# Patient Record
Sex: FEMALE | Race: WHITE | Employment: UNEMPLOYED | URBAN - METROPOLITAN AREA
[De-identification: names, ages, dates, MRNs, and addresses within clinical notes are randomized per-mention and may not be internally consistent; named-entity substitution may affect disease eponyms.]

---

## 2017-03-15 ENCOUNTER — HOSPITAL ENCOUNTER (OUTPATIENT)
Dept: MAMMOGRAPHY | Age: 52
Discharge: HOME | End: 2017-03-15
Attending: OBSTETRICS & GYNECOLOGY
Payer: COMMERCIAL

## 2017-03-15 DIAGNOSIS — Z12.31 VISIT FOR SCREENING MAMMOGRAM: ICD-10-CM

## 2017-03-15 PROCEDURE — 77067 SCR MAMMO BI INCL CAD: CPT

## 2018-02-23 PROBLEM — E11.9 TYPE 2 DIABETES MELLITUS WITHOUT COMPLICATION, WITH LONG-TERM CURRENT USE OF INSULIN (HCC): Status: ACTIVE | Noted: 2018-02-23

## 2018-02-23 PROBLEM — Z79.4 TYPE 2 DIABETES MELLITUS WITHOUT COMPLICATION, WITH LONG-TERM CURRENT USE OF INSULIN (HCC): Status: ACTIVE | Noted: 2018-02-23

## 2018-11-30 ENCOUNTER — HOSPITAL ENCOUNTER (OUTPATIENT)
Dept: MAMMOGRAPHY | Age: 53
Discharge: HOME | End: 2018-11-30
Attending: FAMILY MEDICINE
Payer: COMMERCIAL

## 2018-11-30 DIAGNOSIS — Z12.39 ENCOUNTER FOR BREAST CANCER SCREENING OTHER THAN MAMMOGRAM: ICD-10-CM

## 2018-11-30 PROCEDURE — 77067 SCR MAMMO BI INCL CAD: CPT

## 2019-02-05 ENCOUNTER — APPOINTMENT (OUTPATIENT)
Dept: GENERAL RADIOLOGY | Age: 54
End: 2019-02-05
Attending: EMERGENCY MEDICINE
Payer: COMMERCIAL

## 2019-02-05 ENCOUNTER — HOSPITAL ENCOUNTER (EMERGENCY)
Age: 54
Discharge: HOME | End: 2019-02-06
Attending: EMERGENCY MEDICINE
Payer: COMMERCIAL

## 2019-02-05 DIAGNOSIS — R07.81 RIB PAIN: Primary | ICD-10-CM

## 2019-02-05 DIAGNOSIS — Q89.09 SPLEEN ANOMALY: ICD-10-CM

## 2019-02-05 PROCEDURE — 99283 EMERGENCY DEPT VISIT LOW MDM: CPT | Performed by: EMERGENCY MEDICINE

## 2019-02-05 PROCEDURE — 71046 X-RAY EXAM CHEST 2 VIEWS: CPT

## 2019-02-05 RX ORDER — OMEPRAZOLE 20 MG/1
20 TABLET, DELAYED RELEASE ORAL DAILY
COMMUNITY
End: 2019-06-24

## 2019-02-06 ENCOUNTER — HOSPITAL ENCOUNTER (EMERGENCY)
Age: 54
Discharge: LEFT BEFORE DISCHARGE | End: 2019-02-06
Attending: EMERGENCY MEDICINE
Payer: COMMERCIAL

## 2019-02-06 ENCOUNTER — APPOINTMENT (OUTPATIENT)
Dept: CT IMAGING | Age: 54
End: 2019-02-06
Attending: EMERGENCY MEDICINE
Payer: COMMERCIAL

## 2019-02-06 VITALS
DIASTOLIC BLOOD PRESSURE: 86 MMHG | TEMPERATURE: 98 F | HEART RATE: 75 BPM | RESPIRATION RATE: 16 BRPM | WEIGHT: 178 LBS | OXYGEN SATURATION: 98 % | HEIGHT: 60 IN | SYSTOLIC BLOOD PRESSURE: 143 MMHG | BODY MASS INDEX: 34.95 KG/M2

## 2019-02-06 VITALS
SYSTOLIC BLOOD PRESSURE: 132 MMHG | HEART RATE: 84 BPM | TEMPERATURE: 98.4 F | OXYGEN SATURATION: 98 % | HEIGHT: 60 IN | BODY MASS INDEX: 34.95 KG/M2 | RESPIRATION RATE: 20 BRPM | WEIGHT: 178 LBS | DIASTOLIC BLOOD PRESSURE: 68 MMHG

## 2019-02-06 LAB
ANION GAP SERPL CALC-SCNC: 7 MMOL/L
BASOPHILS # BLD: 0.1 K/UL (ref 0–0.2)
BASOPHILS NFR BLD: 1 % (ref 0–2)
BUN SERPL-MCNC: 25 MG/DL (ref 6–23)
CALCIUM SERPL-MCNC: 9.7 MG/DL (ref 8.3–10.4)
CHLORIDE SERPL-SCNC: 103 MMOL/L (ref 98–107)
CO2 SERPL-SCNC: 29 MMOL/L (ref 21–32)
CREAT SERPL-MCNC: 0.87 MG/DL (ref 0.6–1)
DIFFERENTIAL METHOD BLD: ABNORMAL
EOSINOPHIL # BLD: 0.2 K/UL (ref 0–0.8)
EOSINOPHIL NFR BLD: 3 % (ref 0.5–7.8)
ERYTHROCYTE [DISTWIDTH] IN BLOOD BY AUTOMATED COUNT: 13.3 % (ref 11.9–14.6)
GLUCOSE SERPL-MCNC: 140 MG/DL (ref 65–100)
HCT VFR BLD AUTO: 42.2 % (ref 35.8–46.3)
HGB BLD-MCNC: 14.1 G/DL (ref 11.7–15.4)
IMM GRANULOCYTES # BLD AUTO: 0 K/UL (ref 0–0.5)
IMM GRANULOCYTES NFR BLD AUTO: 0 % (ref 0–5)
LYMPHOCYTES # BLD: 2.4 K/UL (ref 0.5–4.6)
LYMPHOCYTES NFR BLD: 35 % (ref 13–44)
MCH RBC QN AUTO: 31.1 PG (ref 26.1–32.9)
MCHC RBC AUTO-ENTMCNC: 33.4 G/DL (ref 31.4–35)
MCV RBC AUTO: 93.2 FL (ref 79.6–97.8)
MONOCYTES # BLD: 0.4 K/UL (ref 0.1–1.3)
MONOCYTES NFR BLD: 6 % (ref 4–12)
NEUTS SEG # BLD: 3.7 K/UL (ref 1.7–8.2)
NEUTS SEG NFR BLD: 55 % (ref 43–78)
NRBC # BLD: 0 K/UL (ref 0–0.2)
PLATELET # BLD AUTO: 245 K/UL (ref 150–450)
PMV BLD AUTO: 8.6 FL (ref 9.4–12.3)
POTASSIUM SERPL-SCNC: 3.9 MMOL/L (ref 3.5–5.1)
RBC # BLD AUTO: 4.53 M/UL (ref 4.05–5.2)
SODIUM SERPL-SCNC: 139 MMOL/L (ref 136–145)
WBC # BLD AUTO: 6.8 K/UL (ref 4.3–11.1)

## 2019-02-06 PROCEDURE — 74011000258 HC RX REV CODE- 258: Performed by: EMERGENCY MEDICINE

## 2019-02-06 PROCEDURE — 96360 HYDRATION IV INFUSION INIT: CPT | Performed by: EMERGENCY MEDICINE

## 2019-02-06 PROCEDURE — 74177 CT ABD & PELVIS W/CONTRAST: CPT

## 2019-02-06 PROCEDURE — 85025 COMPLETE CBC W/AUTO DIFF WBC: CPT

## 2019-02-06 PROCEDURE — 74011250637 HC RX REV CODE- 250/637: Performed by: EMERGENCY MEDICINE

## 2019-02-06 PROCEDURE — 74011636320 HC RX REV CODE- 636/320: Performed by: EMERGENCY MEDICINE

## 2019-02-06 PROCEDURE — 74011250636 HC RX REV CODE- 250/636: Performed by: EMERGENCY MEDICINE

## 2019-02-06 PROCEDURE — 75810000275 HC EMERGENCY DEPT VISIT NO LEVEL OF CARE: Performed by: EMERGENCY MEDICINE

## 2019-02-06 PROCEDURE — 80048 BASIC METABOLIC PNL TOTAL CA: CPT

## 2019-02-06 PROCEDURE — 74176 CT ABD & PELVIS W/O CONTRAST: CPT

## 2019-02-06 RX ORDER — SODIUM CHLORIDE 0.9 % (FLUSH) 0.9 %
10 SYRINGE (ML) INJECTION
Status: COMPLETED | OUTPATIENT
Start: 2019-02-06 | End: 2019-02-06

## 2019-02-06 RX ORDER — HYDROCODONE BITARTRATE AND ACETAMINOPHEN 5; 325 MG/1; MG/1
1 TABLET ORAL
Qty: 12 TAB | Refills: 0 | Status: SHIPPED | OUTPATIENT
Start: 2019-02-06 | End: 2019-06-24

## 2019-02-06 RX ORDER — SODIUM CHLORIDE 0.9 % (FLUSH) 0.9 %
5-40 SYRINGE (ML) INJECTION AS NEEDED
Status: DISCONTINUED | OUTPATIENT
Start: 2019-02-06 | End: 2019-02-06 | Stop reason: HOSPADM

## 2019-02-06 RX ORDER — SODIUM CHLORIDE 0.9 % (FLUSH) 0.9 %
5-40 SYRINGE (ML) INJECTION EVERY 8 HOURS
Status: DISCONTINUED | OUTPATIENT
Start: 2019-02-06 | End: 2019-02-06 | Stop reason: HOSPADM

## 2019-02-06 RX ORDER — HYDROCODONE BITARTRATE AND ACETAMINOPHEN 5; 325 MG/1; MG/1
1 TABLET ORAL ONCE
Status: COMPLETED | OUTPATIENT
Start: 2019-02-06 | End: 2019-02-06

## 2019-02-06 RX ADMIN — SODIUM CHLORIDE 1000 ML: 900 INJECTION, SOLUTION INTRAVENOUS at 01:16

## 2019-02-06 RX ADMIN — HYDROCODONE BITARTRATE AND ACETAMINOPHEN 1 TABLET: 5; 325 TABLET ORAL at 01:15

## 2019-02-06 RX ADMIN — Medication 10 ML: at 01:59

## 2019-02-06 RX ADMIN — IOPAMIDOL 100 ML: 755 INJECTION, SOLUTION INTRAVENOUS at 01:59

## 2019-02-06 RX ADMIN — SODIUM CHLORIDE 100 ML: 900 INJECTION, SOLUTION INTRAVENOUS at 01:59

## 2019-02-06 NOTE — ED NOTES
I have reviewed discharge instructions with the patient. The patient verbalized understanding. Patient left ED via Discharge Method: ambulatory to Home with (). Opportunity for questions and clarification provided. Patient given 1 scripts. To continue your aftercare when you leave the hospital, you may receive an automated call from our care team to check in on how you are doing. This is a free service and part of our promise to provide the best care and service to meet your aftercare needs.  If you have questions, or wish to unsubscribe from this service please call [phone]. Thank you for Choosing our [facility] Emergency Department.

## 2019-02-06 NOTE — DISCHARGE INSTRUCTIONS
Use pain medication as needed.   Contact your primary care physician to arrange for follow-up and to discuss scheduling of a possible MRI scan of the abdomen to further evaluate the nodularity noted on your spleen tonight

## 2019-02-06 NOTE — ED PROVIDER NOTES
71-year-old lady presents with concerns about pain in her left flank and very severe tonight. She said that she had a coughing illness about two weeks ago and at the end of that she thought that she may have pulled a muscle in that side. She went to her primary care doctor who prescribed some anti-inflammatories. She said that she has not gotten any better and then tonight the pain became much more severe. She says it feels like a squeezing and spasming in her left flank. She denies any history of known kidney stones. Overall she rates her pain as an eight out of ten and says it does not radiate anywhere. She tried one of her 's lidocaine patches and says it did not help. Elements of this note were created using speech recognition software. As such, errors of speech recognition may be present. Past Medical History:  
Diagnosis Date  Allergic rhinitis  Anxiety  Carpal tunnel syndrome  Depression  Deviated septum  GERD (gastroesophageal reflux disease)  Hypertension  Nasal obstruction  OSMANI (obstructive sleep apnea) Past Surgical History:  
Procedure Laterality Date  HX CARPAL TUNNEL RELEASE    
 bilateral  
 HX ORTHOPAEDIC Right   
 foot surgery/  
 HX ORTHOPAEDIC    
 left ball joint replaced  HX OTHER SURGICAL    
 lipoma removed from back  HX ANSON AND BSO  2015 [address] Family History:  
Problem Relation Age of Onset  Hypertension Mother  Cancer Mother   
     lung  Heart Disease Mother   
     mi  Hypertension Father  Colon Cancer Father 58  
 Heart Disease Father   
     cabg/chf  
 Diabetes Brother  Depression Other  Breast Cancer Paternal Aunt 58  Malignant Hyperthermia Neg Hx  Pseudocholinesterase Deficiency Neg Hx  Delayed Awakening Neg Hx  Post-op Nausea/Vomiting Neg Hx  Emergence Delirium Neg Hx  Post-op Cognitive Dysfunction Neg Hx  Ovarian Cancer Neg Hx Social History Socioeconomic History  Marital status:  Spouse name: Not on file  Number of children: 2  
 Years of education: Not on file  Highest education level: Not on file Social Needs  Financial resource strain: Not on file  Food insecurity - worry: Not on file  Food insecurity - inability: Not on file  Transportation needs - medical: Not on file  Transportation needs - non-medical: Not on file Occupational History  Not on file Tobacco Use  Smoking status: Former Smoker Packs/day: 0.50 Years: 15.00 Pack years: 7.50 Last attempt to quit: 3/27/2007 Years since quittin.8  Smokeless tobacco: Never Used Substance and Sexual Activity  Alcohol use: No  
 Drug use: No  
 Sexual activity: Yes Birth control/protection: Surgical  
  Comment: tubal  
Other Topics Concern [address] Service Not Asked  Blood Transfusions Not Asked  Caffeine Concern Not Asked  Occupational Exposure Not Asked Virgilio Pacini Hazards Not Asked  Sleep Concern Not Asked  Stress Concern Not Asked  Weight Concern Not Asked  Special Diet No  
 Back Care Not Asked  Exercise No  
 Bike Helmet Not Asked  Seat Belt Not Asked  Self-Exams Not Asked Social History Narrative  Not on file ALLERGIES: Patient has no known allergies. Review of Systems Constitutional: Negative for chills, diaphoresis and fever. HENT: Negative for congestion, rhinorrhea and sore throat. Eyes: Negative for redness and visual disturbance. Respiratory: Positive for cough. Negative for chest tightness, shortness of breath and wheezing. Cardiovascular: Negative for chest pain and palpitations. Gastrointestinal: Negative for abdominal pain, blood in stool, diarrhea, nausea and vomiting. Genitourinary: Negative for dysuria and hematuria. Musculoskeletal: Negative for arthralgias, myalgias and neck stiffness. Vitals:  
 02/05/19 2332 BP: 146/82 Pulse: 77 Resp: 16 Temp: 98.2 °F (36.8 °C) SpO2: 97% Weight: 80.7 kg (178 lb) Height: 5' (1.524 m) Physical Exam  
Constitutional: She is oriented to person, place, and time. She appears well-developed and well-nourished. HENT:  
Head: Normocephalic and atraumatic. Cardiovascular: Normal rate and regular rhythm. Pulmonary/Chest: Effort normal and breath sounds normal.  
Musculoskeletal: She exhibits no deformity. Left flank tenderness Neurological: She is alert and oriented to person, place, and time. Nursing note and vitals reviewed. MDM Number of Diagnoses or Management Options Diagnosis management comments: X-rays were unremarkable. Noncontrast CT scan shows no evidence of a kidney stone. However, there is an abnormality with her spleen requiring further investigation. Therefore I will order a CT with IV contrast. 
 
3:09 AM 
Care of patient from Dr. Torito Hernandez at the end of his shift. The blood work was unremarkable. All within normal limits. CAT scan of the abdomen and pelvis was with IV contrast was read by the radiologist as showing some irregular nodularity of the spleen. No hemorrhage. No abscesses. They were concerned for possible hemangioma or possible lymphoma. I doubt the lymphoma with a normal white count tonight. I went back and spoke with the patient and her  about these results. I will prescribe some Norco for the rib and possible spleen pain they will contact her primary care physician tomorrow for follow-up and further testing. Radiologist recommended an MRI of the abdomen to further characterize this splenic lesion. Oscar Mckeon MD 
Voice dictation software was used during the making of this note.   This software is not perfect and grammatical and other typographical errors may be present. This note has been proofread, but may still contain errors. Charanjit Caceres MD; 2/6/2019 @3:10 AM  
=================================================================== Amount and/or Complexity of Data Reviewed Review and summarize past medical records: yes Independent visualization of images, tracings, or specimens: yes Risk of Complications, Morbidity, and/or Mortality Presenting problems: moderate Diagnostic procedures: moderate Management options: moderate Patient Progress Patient progress: stable Procedures

## 2019-02-06 NOTE — ED TRIAGE NOTES
Pt to er c/o left rib pain, sts pain has been going on for about a week, sts she went to her family doctor and sts pain is worse when she coughs

## 2019-02-06 NOTE — ED TRIAGE NOTES
Pt reports back pain/rib pain. Pt states she was seen here for same last night and had two CT and xray. Pt states she was told to have MRI. Pt unable to get into primary care doctor today. \ Shannon Negron RN

## 2019-02-07 ENCOUNTER — HOSPITAL ENCOUNTER (OUTPATIENT)
Dept: MRI IMAGING | Age: 54
Discharge: HOME | End: 2019-02-07
Attending: FAMILY MEDICINE
Payer: COMMERCIAL

## 2019-02-07 DIAGNOSIS — R93.5 ABNORMAL CT OF THE ABDOMEN: ICD-10-CM

## 2019-02-07 PROCEDURE — 74183 MRI ABD W/O CNTR FLWD CNTR: CPT

## 2019-02-07 PROCEDURE — 74011000258 HC RX REV CODE- 258: Performed by: FAMILY MEDICINE

## 2019-02-07 PROCEDURE — A9575 INJ GADOTERATE MEGLUMI 0.1ML: HCPCS | Performed by: FAMILY MEDICINE

## 2019-02-07 PROCEDURE — 74011250636 HC RX REV CODE- 250/636: Performed by: FAMILY MEDICINE

## 2019-02-07 RX ORDER — SODIUM CHLORIDE 0.9 % (FLUSH) 0.9 %
10 SYRINGE (ML) INJECTION
Status: COMPLETED | OUTPATIENT
Start: 2019-02-07 | End: 2019-02-07

## 2019-02-07 RX ORDER — GADOTERATE MEGLUMINE 376.9 MG/ML
17 INJECTION INTRAVENOUS
Status: COMPLETED | OUTPATIENT
Start: 2019-02-07 | End: 2019-02-07

## 2019-02-07 RX ADMIN — SODIUM CHLORIDE 100 ML: 900 INJECTION, SOLUTION INTRAVENOUS at 13:10

## 2019-02-07 RX ADMIN — Medication 10 ML: at 13:10

## 2019-02-07 RX ADMIN — GADOTERATE MEGLUMINE 17 ML: 376.9 INJECTION INTRAVENOUS at 13:10

## 2019-02-07 NOTE — PROGRESS NOTES
PLEASE CALL PATIENT:  The mri says that the lesion in the spleen is likely a benign hemangioma.   Since the best they can give us is a likely they want a repeat ct in 3 to 4 months

## 2019-06-24 ENCOUNTER — HOSPITAL ENCOUNTER (EMERGENCY)
Age: 54
Discharge: HOME | End: 2019-06-24
Attending: EMERGENCY MEDICINE
Payer: COMMERCIAL

## 2019-06-24 ENCOUNTER — APPOINTMENT (OUTPATIENT)
Dept: GENERAL RADIOLOGY | Age: 54
End: 2019-06-24
Attending: EMERGENCY MEDICINE
Payer: COMMERCIAL

## 2019-06-24 VITALS
BODY MASS INDEX: 35.53 KG/M2 | DIASTOLIC BLOOD PRESSURE: 82 MMHG | SYSTOLIC BLOOD PRESSURE: 142 MMHG | OXYGEN SATURATION: 99 % | HEART RATE: 80 BPM | RESPIRATION RATE: 16 BRPM | TEMPERATURE: 98.8 F | HEIGHT: 60 IN | WEIGHT: 181 LBS

## 2019-06-24 DIAGNOSIS — N30.00 ACUTE CYSTITIS WITHOUT HEMATURIA: Primary | ICD-10-CM

## 2019-06-24 DIAGNOSIS — R21 RASH AND OTHER NONSPECIFIC SKIN ERUPTION: ICD-10-CM

## 2019-06-24 DIAGNOSIS — M79.10 MYALGIA: ICD-10-CM

## 2019-06-24 LAB
ALBUMIN SERPL-MCNC: 3.9 G/DL (ref 3.5–5)
ALBUMIN/GLOB SERPL: 1.1 {RATIO} (ref 1.2–3.5)
ALP SERPL-CCNC: 91 U/L (ref 50–136)
ALT SERPL-CCNC: 119 U/L (ref 12–65)
ANION GAP SERPL CALC-SCNC: 8 MMOL/L (ref 7–16)
AST SERPL-CCNC: 102 U/L (ref 15–37)
BACTERIA URNS QL MICRO: ABNORMAL /HPF
BASOPHILS # BLD: 0 K/UL (ref 0–0.2)
BASOPHILS NFR BLD: 1 % (ref 0–2)
BILIRUB SERPL-MCNC: 0.5 MG/DL (ref 0.2–1.1)
BUN SERPL-MCNC: 23 MG/DL (ref 6–23)
CALCIUM SERPL-MCNC: 9.4 MG/DL (ref 8.3–10.4)
CASTS URNS QL MICRO: ABNORMAL /LPF
CHLORIDE SERPL-SCNC: 104 MMOL/L (ref 98–107)
CO2 SERPL-SCNC: 28 MMOL/L (ref 21–32)
CREAT SERPL-MCNC: 1 MG/DL (ref 0.6–1)
DIFFERENTIAL METHOD BLD: ABNORMAL
EOSINOPHIL # BLD: 0.1 K/UL (ref 0–0.8)
EOSINOPHIL NFR BLD: 2 % (ref 0.5–7.8)
EPI CELLS #/AREA URNS HPF: ABNORMAL /HPF
ERYTHROCYTE [DISTWIDTH] IN BLOOD BY AUTOMATED COUNT: 13.2 % (ref 11.9–14.6)
FLUAV AG NPH QL IA: NEGATIVE
FLUBV AG NPH QL IA: NEGATIVE
GLOBULIN SER CALC-MCNC: 3.7 G/DL (ref 2.3–3.5)
GLUCOSE SERPL-MCNC: 101 MG/DL (ref 65–100)
HCT VFR BLD AUTO: 42.3 % (ref 35.8–46.3)
HGB BLD-MCNC: 14.2 G/DL (ref 11.7–15.4)
IMM GRANULOCYTES # BLD AUTO: 0 K/UL (ref 0–0.5)
IMM GRANULOCYTES NFR BLD AUTO: 0 % (ref 0–5)
LYMPHOCYTES # BLD: 1.9 K/UL (ref 0.5–4.6)
LYMPHOCYTES NFR BLD: 30 % (ref 13–44)
MCH RBC QN AUTO: 30.7 PG (ref 26.1–32.9)
MCHC RBC AUTO-ENTMCNC: 33.6 G/DL (ref 31.4–35)
MCV RBC AUTO: 91.6 FL (ref 79.6–97.8)
MONOCYTES # BLD: 0.5 K/UL (ref 0.1–1.3)
MONOCYTES NFR BLD: 8 % (ref 4–12)
NEUTS SEG # BLD: 3.6 K/UL (ref 1.7–8.2)
NEUTS SEG NFR BLD: 59 % (ref 43–78)
NRBC # BLD: 0 K/UL (ref 0–0.2)
PLATELET # BLD AUTO: 195 K/UL (ref 150–450)
PMV BLD AUTO: 8.7 FL (ref 9.4–12.3)
POTASSIUM SERPL-SCNC: 3.6 MMOL/L (ref 3.5–5.1)
PROT SERPL-MCNC: 7.6 G/DL (ref 6.3–8.2)
RBC # BLD AUTO: 4.62 M/UL (ref 4.05–5.2)
RBC #/AREA URNS HPF: ABNORMAL /HPF
SODIUM SERPL-SCNC: 140 MMOL/L (ref 136–145)
SPECIMEN SOURCE: NORMAL
WBC # BLD AUTO: 6.2 K/UL (ref 4.3–11.1)
WBC URNS QL MICRO: ABNORMAL /HPF

## 2019-06-24 PROCEDURE — 96365 THER/PROPH/DIAG IV INF INIT: CPT | Performed by: EMERGENCY MEDICINE

## 2019-06-24 PROCEDURE — 81015 MICROSCOPIC EXAM OF URINE: CPT

## 2019-06-24 PROCEDURE — 71045 X-RAY EXAM CHEST 1 VIEW: CPT

## 2019-06-24 PROCEDURE — 96361 HYDRATE IV INFUSION ADD-ON: CPT | Performed by: EMERGENCY MEDICINE

## 2019-06-24 PROCEDURE — 85025 COMPLETE CBC W/AUTO DIFF WBC: CPT

## 2019-06-24 PROCEDURE — 96375 TX/PRO/DX INJ NEW DRUG ADDON: CPT | Performed by: EMERGENCY MEDICINE

## 2019-06-24 PROCEDURE — 80053 COMPREHEN METABOLIC PANEL: CPT

## 2019-06-24 PROCEDURE — 99284 EMERGENCY DEPT VISIT MOD MDM: CPT | Performed by: EMERGENCY MEDICINE

## 2019-06-24 PROCEDURE — 74011000258 HC RX REV CODE- 258: Performed by: EMERGENCY MEDICINE

## 2019-06-24 PROCEDURE — 81003 URINALYSIS AUTO W/O SCOPE: CPT | Performed by: EMERGENCY MEDICINE

## 2019-06-24 PROCEDURE — 87804 INFLUENZA ASSAY W/OPTIC: CPT

## 2019-06-24 PROCEDURE — 74011250636 HC RX REV CODE- 250/636: Performed by: EMERGENCY MEDICINE

## 2019-06-24 RX ORDER — KETOROLAC TROMETHAMINE 30 MG/ML
30 INJECTION, SOLUTION INTRAMUSCULAR; INTRAVENOUS
Status: COMPLETED | OUTPATIENT
Start: 2019-06-24 | End: 2019-06-24

## 2019-06-24 RX ORDER — CEPHALEXIN 500 MG/1
500 CAPSULE ORAL 2 TIMES DAILY
Qty: 14 CAP | Refills: 0 | Status: SHIPPED | OUTPATIENT
Start: 2019-06-24 | End: 2019-07-01

## 2019-06-24 RX ORDER — ONDANSETRON 2 MG/ML
4 INJECTION INTRAMUSCULAR; INTRAVENOUS
Status: COMPLETED | OUTPATIENT
Start: 2019-06-24 | End: 2019-06-24

## 2019-06-24 RX ADMIN — ONDANSETRON 4 MG: 2 INJECTION INTRAMUSCULAR; INTRAVENOUS at 19:15

## 2019-06-24 RX ADMIN — CEFTRIAXONE 1 G: 1 INJECTION, POWDER, FOR SOLUTION INTRAMUSCULAR; INTRAVENOUS at 19:15

## 2019-06-24 RX ADMIN — SODIUM CHLORIDE 1000 ML: 900 INJECTION, SOLUTION INTRAVENOUS at 18:25

## 2019-06-24 RX ADMIN — KETOROLAC TROMETHAMINE 30 MG: 30 INJECTION, SOLUTION INTRAMUSCULAR at 19:15

## 2019-06-24 NOTE — DISCHARGE INSTRUCTIONS
Follow-up with PCP in 24-48 hrs. Schedule close follow-up w/ Dermatology in 24-48 hrs. Return to ED if symptoms worsen or progress in any way. Urinary Tract Infection in Women: Care Instructions  Your Care Instructions    A urinary tract infection, or UTI, is a general term for an infection anywhere between the kidneys and the urethra (where urine comes out). Most UTIs are bladder infections. They often cause pain or burning when you urinate. UTIs are caused by bacteria and can be cured with antibiotics. Be sure to complete your treatment so that the infection goes away. Follow-up care is a key part of your treatment and safety. Be sure to make and go to all appointments, and call your doctor if you are having problems. It's also a good idea to know your test results and keep a list of the medicines you take. How can you care for yourself at home? · Take your antibiotics as directed. Do not stop taking them just because you feel better. You need to take the full course of antibiotics. · Drink extra water and other fluids for the next day or two. This may help wash out the bacteria that are causing the infection. (If you have kidney, heart, or liver disease and have to limit fluids, talk with your doctor before you increase your fluid intake.)  · Avoid drinks that are carbonated or have caffeine. They can irritate the bladder. · Urinate often. Try to empty your bladder each time. · To relieve pain, take a hot bath or lay a heating pad set on low over your lower belly or genital area. Never go to sleep with a heating pad in place. To prevent UTIs  · Drink plenty of water each day. This helps you urinate often, which clears bacteria from your system. (If you have kidney, heart, or liver disease and have to limit fluids, talk with your doctor before you increase your fluid intake.)  · Urinate when you need to. · Urinate right after you have sex. · Change sanitary pads often.   · Avoid douches, bubble baths, feminine hygiene sprays, and other feminine hygiene products that have deodorants. · After going to the bathroom, wipe from front to back. When should you call for help? Call your doctor now or seek immediate medical care if:    · Symptoms such as fever, chills, nausea, or vomiting get worse or appear for the first time.     · You have new pain in your back just below your rib cage. This is called flank pain.     · There is new blood or pus in your urine.     · You have any problems with your antibiotic medicine.    Watch closely for changes in your health, and be sure to contact your doctor if:    · You are not getting better after taking an antibiotic for 2 days.     · Your symptoms go away but then come back. Where can you learn more? Go to http://mesha-bobby.info/. Enter X249 in the search box to learn more about \"Urinary Tract Infection in Women: Care Instructions. \"  Current as of: March 20, 2018  Content Version: 11.9  © 7911-2042 [employer]. Care instructions adapted under license by [employer] (which disclaims liability or warranty for this information). If you have questions about a medical condition or this instruction, always ask your healthcare professional. [place] [address] any warranty or liability for your use of this information. Muscle Aches: Care Instructions  Your Care Instructions    Muscle aches have many possible causes. Some common ones are overuse, tension, and injuries such as a strained muscle. An infection such as the flu can cause muscle aches. Or the aches may be caused by some medicines, such as antipsychotics. Muscle aches may also be a symptom of a disease like lupus or fibromyalgia. Myalgia is the medical term for muscle aches. The doctor will do a physical exam and ask questions to try to find what is causing your pain. You may also have tests such as blood tests or imaging tests like X-rays.  These can help find or rule out serious problems. The doctor has checked you carefully, but problems can develop later. If you notice any problems or new symptoms, get medical treatment right away. Follow-up care is a key part of your treatment and safety. Be sure to make and go to all appointments, and call your doctor if you are having problems. It's also a good idea to know your test results and keep a list of the medicines you take. How can you care for yourself at home? · Rest the area that hurts. You may need to stop or reduce the activity that causes your symptoms. Then you can return to it slowly. · Put ice or a cold pack on the area for 10 to 20 minutes at a time to ease pain. Put a thin cloth between the ice and your skin. · Take an over-the-counter pain medicine, such as acetaminophen (Tylenol), ibuprofen (Advil, Motrin), or naproxen (Aleve). Be safe with medicines. Read and follow all instructions on the label. When should you call for help? Call your doctor now or seek immediate medical care if:    · Your pain gets worse.     · You have new symptoms, such as a fever, swelling, or a rash.    Watch closely for changes in your health, and be sure to contact your doctor if:    · You do not get better as expected. Where can you learn more? Go to http://mesha-bobby.info/. Enter G355 in the search box to learn more about \"Muscle Aches: Care Instructions. \"  Current as of: Tish 3, 2018  Content Version: 11.9  © 3935-5970 [employer]. Care instructions adapted under license by [employer] (which disclaims liability or warranty for this information).  If you have questions about a medical condition or this instruction, always ask your healthcare professional. [place] [address] any warranty or liability for your use of this information.       Patient Education        Rash: Care Instructions  Your Care Instructions  A rash is any irritation or inflammation of the skin. Rashes have many possible causes, including allergy, infection, illness, heat, and emotional stress. Follow-up care is a key part of your treatment and safety. Be sure to make and go to all appointments, and call your doctor if you are having problems. It's also a good idea to know your test results and keep a list of the medicines you take. How can you care for yourself at home? · Wash the area with water only. Soap can make dryness and itching worse. Pat dry. · Put cold, wet cloths on the rash to reduce itching. · Keep cool, and stay out of the sun. · Leave the rash open to the air as much of the time as possible. · Sometimes petroleum jelly (Vaseline) can help relieve the discomfort caused by a rash. A moisturizing lotion, such as Cetaphil, also may help. Calamine lotion may help for rashes caused by contact with something (such as a plant or soap) that irritated the skin. Use it 3 or 4 times a day. · If your doctor prescribed a cream, use it as directed. If your doctor prescribed medicine, take it exactly as directed. · If your rash itches so badly that it interferes with your normal activities, take an over-the-counter antihistamine, such as diphenhydramine (Benadryl) or loratadine (Claritin). Read and follow all instructions on the label. When should you call for help? Call your doctor now or seek immediate medical care if:    · You have signs of infection, such as:  ? Increased pain, swelling, warmth, or redness. ? Red streaks leading from the area. ? Pus draining from the area. ? A fever.     · You have joint pain along with the rash.    Watch closely for changes in your health, and be sure to contact your doctor if:    · Your rash is changing or getting worse. For example, call if you have pain along with the rash, the rash is spreading, or you have new blisters.     · You do not get better after 1 week. Where can you learn more?   Go to http://mesha-bobyb.info/. Enter U249 in the search box to learn more about \"Rash: Care Instructions. \"  Current as of: April 17, 2018  Content Version: 11.9  © 0839-7714 [employer]. Care instructions adapted under license by [employer] (which disclaims liability or warranty for this information). If you have questions about a medical condition or this instruction, always ask your healthcare professional. [place] [address] any warranty or liability for your use of this information.

## 2019-06-24 NOTE — ED TRIAGE NOTES
Pt in states body aches, chills, rash and diarrhea x 4 days. Attempted ibuprofen without relief. States nausea denies vomiting.

## 2019-06-24 NOTE — ED PROVIDER NOTES
54-year-old female with history of hypertension presents with complaint of diffuse body aches, chills, diarrhea since Friday. Patient states she believes that she has the flu. Patient denies rhinorrhea, nasal congestion, headache, neck pain, abdominal pain, chest pain, sore throat, dysuria, hematuria. Patient states she had recent sick contact at work on last Monday with viral-like illness. Patient also states that today she noticed a rash developing on bilateral arms into upper part of her abdomen. The history is provided by the patient. No  was used. Generalized Body Aches   This is a new problem. The current episode started more than 2 days ago. The problem occurs constantly. The problem has not changed since onset. Pertinent negatives include no chest pain, no abdominal pain, no headaches and no shortness of breath. Nothing aggravates the symptoms. Nothing relieves the symptoms. Treatments tried: Ibuprofen  The treatment provided no relief.         Past Medical History:   Diagnosis Date    Allergic rhinitis     Anxiety     Carpal tunnel syndrome     Depression     Deviated septum     GERD (gastroesophageal reflux disease)     Hypertension     Nasal obstruction     OSMANI (obstructive sleep apnea)        Past Surgical History:   Procedure Laterality Date    HX CARPAL TUNNEL RELEASE      bilateral    HX ORTHOPAEDIC Right     foot surgery/    HX ORTHOPAEDIC      left ball joint replaced    HX OTHER SURGICAL      lipoma removed from back    HX ANSON AND BSO  2015    HX TUBAL LIGATION  1999         Family History:   Problem Relation Age of Onset    Hypertension Mother     Cancer Mother         lung    Heart Disease Mother         mi    Hypertension Father     Colon Cancer Father 58    Heart Disease Father         cabg/chf    Diabetes Brother     Depression Other     Breast Cancer Paternal Aunt 58    Malignant Hyperthermia Neg Hx     Pseudocholinesterase Deficiency Neg Hx     Delayed Awakening Neg Hx     Post-op Nausea/Vomiting Neg Hx     Emergence Delirium Neg Hx     Post-op Cognitive Dysfunction Neg Hx     Ovarian Cancer Neg Hx        Social History     Socioeconomic History    Marital status:      Spouse name: Not on file    Number of children: 2    Years of education: Not on file    Highest education level: Not on file   Occupational History    Not on file   Social Needs    Financial resource strain: Not on file    Food insecurity:     Worry: Not on file     Inability: Not on file    Transportation needs:     Medical: Not on file     Non-medical: Not on file   Tobacco Use    Smoking status: Former Smoker     Packs/day: 0.50     Years: 15.00     Pack years: 7.50     Last attempt to quit: 3/27/2007     Years since quittin.2    Smokeless tobacco: Never Used   Substance and Sexual Activity    Alcohol use: No    Drug use: No    Sexual activity: Yes     Birth control/protection: Surgical     Comment: tubal   Lifestyle    Physical activity:     Days per week: Not on file     Minutes per session: Not on file    Stress: Not on file   Relationships    Social connections:     Talks on phone: Not on file     Gets together: Not on file     Attends Druze service: Not on file     Active member of club or organization: Not on file     Attends meetings of clubs or organizations: Not on file     Relationship status: Not on file    Intimate partner violence:     Fear of current or ex partner: Not on file     Emotionally abused: Not on file     Physically abused: Not on file     Forced sexual activity: Not on file   Other Topics Concern     Service Not Asked    Blood Transfusions Not Asked    Caffeine Concern Not Asked    Occupational Exposure Not Asked   Helyn Ciara Hazards Not Asked    Sleep Concern Not Asked    Stress Concern Not Asked    Weight Concern Not Asked    Special Diet No    Back Care Not Asked    Exercise No    Bike Helmet Not Asked   [address] [facility] Not Asked    Self-Exams Not Asked   Social History Narrative    Not on file         ALLERGIES: Patient has no known allergies. Review of Systems   Constitutional: Positive for chills. Negative for fatigue and fever. HENT: Negative for congestion, rhinorrhea and sore throat. Respiratory: Positive for cough. Negative for shortness of breath. Cardiovascular: Negative for chest pain, palpitations and leg swelling. Gastrointestinal: Positive for diarrhea. Negative for abdominal pain, blood in stool, constipation, nausea and vomiting. Genitourinary: Negative for dysuria and flank pain. Musculoskeletal: Positive for myalgias. Negative for arthralgias, gait problem, neck pain and neck stiffness. Skin: Negative for color change, pallor and wound. Neurological: Negative for dizziness, weakness, light-headedness and headaches. Psychiatric/Behavioral: Negative for confusion. Vitals:    06/24/19 1732   BP: (!) 144/94   Pulse: 84   Resp: 18   Temp: 98.4 °F (36.9 °C)   SpO2: 97%   Weight: 82.1 kg (181 lb)   Height: 5' (1.524 m)            Physical Exam   Constitutional: She is oriented to person, place, and time. She appears well-developed and well-nourished. Well appearing and in NAD. HENT:   Head: Normocephalic. Mouth/Throat: Oropharynx is clear and moist. No oropharyngeal exudate. MMM. No tonsillar erythema or exudate. Eyes: Pupils are equal, round, and reactive to light. Conjunctivae and EOM are normal.   Neck: Normal range of motion. No JVD present. No tracheal deviation present. No nuchal rigidity. Cardiovascular: Normal rate, regular rhythm, normal heart sounds and intact distal pulses. RRR. Pulses 2+ and equal bilaterally. Pulmonary/Chest: Effort normal and breath sounds normal.   CTAB. No wheezes, rhonchi, or rales. Abdominal: Soft. Bowel sounds are normal.   Soft, NTND. No rebound or guarding. No CVAT. Musculoskeletal: Normal range of motion. No LE edema. No calf TTP. Neurological: She is alert and oriented to person, place, and time. No cranial nerve deficit. Strength 5/5 throughout. Normal sensory. No meningismus or nuchal rigidity. Skin: Skin is warm and dry. Rash noted. No purpura noted. Rash is maculopapular. Rash is not macular, not papular, not nodular, not pustular, not vesicular and not urticarial.        Non-specific rash present to chest wall and bilateral ACs. Nursing note and vitals reviewed. MDM  Number of Diagnoses or Management Options  Acute cystitis without hematuria: new and requires workup  Myalgia: new and requires workup  Rash and other nonspecific skin eruption: new and requires workup  Diagnosis management comments: UA with evidence of UTI. Remainder of labs within normal limits. Patient states that she has chronically elevated LFTs. Chest x-ray negative. Patient given IV fluid hydration, Rocephin, Toradol, Zofran. Patient certainly for close follow-up with PCP on tomorrow and given strict return precautions. Additionally patient instructed to follow-up w/ Dermatologist within 1-2 days. Patient verbalizes understanding and is in agreement with plan. Amount and/or Complexity of Data Reviewed  Clinical lab tests: ordered and reviewed  Tests in the radiology section of CPT®: ordered and reviewed  Tests in the medicine section of CPT®: ordered and reviewed  Review and summarize past medical records: yes  Independent visualization of images, tracings, or specimens: yes    Risk of Complications, Morbidity, and/or Mortality  Presenting problems: moderate  Diagnostic procedures: low  Management options: low    Patient Progress  Patient progress: stable    ED Course as of Jun 24 1945   Mon Jun 24, 2019   1838 CXR Impression:  Improved low lung volumes.     [DF]      ED Course User Index  [DF] China Odom MD       Procedures               Results Include:    Recent Results (from the past 25 hour(s))   CBC WITH AUTOMATED DIFF    Collection Time: 06/24/19  5:49 PM   Result Value Ref Range    WBC 6.2 4.3 - 11.1 K/uL    RBC 4.62 4.05 - 5.2 M/uL    HGB 14.2 11.7 - 15.4 g/dL    HCT 42.3 35.8 - 46.3 %    MCV 91.6 79.6 - 97.8 FL    MCH 30.7 26.1 - 32.9 PG    MCHC 33.6 31.4 - 35.0 g/dL    RDW 13.2 11.9 - 14.6 %    PLATELET 774 744 - 525 K/uL    MPV 8.7 (L) 9.4 - 12.3 FL    ABSOLUTE NRBC 0.00 0.0 - 0.2 K/uL    DF AUTOMATED      NEUTROPHILS 59 43 - 78 %    LYMPHOCYTES 30 13 - 44 %    MONOCYTES 8 4.0 - 12.0 %    EOSINOPHILS 2 0.5 - 7.8 %    BASOPHILS 1 0.0 - 2.0 %    IMMATURE GRANULOCYTES 0 0.0 - 5.0 %    ABS. NEUTROPHILS 3.6 1.7 - 8.2 K/UL    ABS. LYMPHOCYTES 1.9 0.5 - 4.6 K/UL    ABS. MONOCYTES 0.5 0.1 - 1.3 K/UL    ABS. EOSINOPHILS 0.1 0.0 - 0.8 K/UL    ABS. BASOPHILS 0.0 0.0 - 0.2 K/UL    ABS. IMM. GRANS. 0.0 0.0 - 0.5 K/UL   METABOLIC PANEL, COMPREHENSIVE    Collection Time: 06/24/19  5:49 PM   Result Value Ref Range    Sodium 140 136 - 145 mmol/L    Potassium 3.6 3.5 - 5.1 mmol/L    Chloride 104 98 - 107 mmol/L    CO2 28 21 - 32 mmol/L    Anion gap 8 7 - 16 mmol/L    Glucose 101 (H) 65 - 100 mg/dL    BUN 23 6 - 23 MG/DL    Creatinine 1.00 0.6 - 1.0 MG/DL    GFR est AA >60 >60 ml/min/1.73m2    GFR est non-AA >60 >60 ml/min/1.73m2    Calcium 9.4 8.3 - 10.4 MG/DL    Bilirubin, total 0.5 0.2 - 1.1 MG/DL    ALT (SGPT) 119 (H) 12 - 65 U/L    AST (SGOT) 102 (H) 15 - 37 U/L    Alk.  phosphatase 91 50 - 136 U/L    Protein, total 7.6 6.3 - 8.2 g/dL    Albumin 3.9 3.5 - 5.0 g/dL    Globulin 3.7 (H) 2.3 - 3.5 g/dL    A-G Ratio 1.1 (L) 1.2 - 3.5     INFLUENZA A & B AG (RAPID TEST)    Collection Time: 06/24/19  6:24 PM   Result Value Ref Range    Influenza A Ag NEGATIVE  NEG      Influenza B Ag NEGATIVE  NEG      Source NASOPHARYNGEAL     URINE MICROSCOPIC    Collection Time: 06/24/19  6:32 PM   Result Value Ref Range    WBC 10-20 0 /hpf    RBC 3-5 0 /hpf    Epithelial cells 5-10 0 /hpf    Bacteria 1+ (H) 0 /hpf    Casts 3-5 0 /lpf                 Laith Baptiste MD; 6/24/2019 @6:02 PM Voice dictation software was used during the making of this note. This software is not perfect and grammatical and other typographical errors may be present.   This note has not been proofread for errors.  ===================================================================

## 2019-06-25 NOTE — ED NOTES
I have reviewed discharge instructions with the patient. The patient verbalized understanding. Patient left ED via Discharge Method: ambulatory to Home with self. Opportunity for questions and clarification provided. Patient given 1 scripts. To continue your aftercare when you leave the hospital, you may receive an automated call from our care team to check in on how you are doing. This is a free service and part of our promise to provide the best care and service to meet your aftercare needs.  If you have questions, or wish to unsubscribe from this service please call [phone]. Thank you for Choosing our [facility] Emergency Department.

## 2020-07-20 RX ORDER — SODIUM CHLORIDE 0.9 % (FLUSH) 0.9 %
5-40 SYRINGE (ML) INJECTION EVERY 8 HOURS
Status: CANCELLED | OUTPATIENT
Start: 2020-07-20

## 2020-07-20 RX ORDER — SODIUM CHLORIDE 0.9 % (FLUSH) 0.9 %
5-40 SYRINGE (ML) INJECTION AS NEEDED
Status: CANCELLED | OUTPATIENT
Start: 2020-07-20

## 2020-07-20 NOTE — H&P
Date of Service: 2020-07-09  Work Status:  ????? Allergies:????? 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 She felt her   pieces of furniture 4 days ago on 7/5/20. In lifting furniture she heard and felt a pop in the anterior aspect of her left elbow region. She's had some pain there and hurts when she fully straightens the elbow all the way. She still has some pulling pain around the elbow with activities or use. PH, SH and ROS: This form was filled out reviewed and included in the chart. Patient has a negative review of systems she doesn't smoke or drink. She has a history of sleep apnea and uses CPAP, diabetes, high cholesterol and high blood pressure. She has had a hysterectomy several years ago and also had a fractured up on her foot that was operated on. Current medicines: Are listed above. Drug allergies: None known    PE: The patient is an alert oriented done middle-aged white female who is 5 feet tall and weighs 179 pounds. Exam of the left upper extremity shows no obvious deformity of the left elbow region. There is tenderness in the antecubital fossa and along the course of the biceps tendon. I cannot definitely feel a biceps tendon across the antecubital region though the patient is tender enough that I can't get deeply for the tendon. It is certainly not as prominent as the one on the opposite right elbow. Patient has pain with resisted elbow flexion and resisted supination. I cannot take tell whether or not there is a proximal migration of the muscle belly as it is time prominent in the arm. Circulation:  There is a palpable radial pulse at the wrist.  Capillary refill is brisk in the fingertips. Neuro exam: Sensation is intact to light touch in the radial, median and ulnar nerve distributions. Skin and nails:  There are no open wounds, rashes or lesions noted. The nails appear normal.    Diagnosis:  Rupture of left distal biceps    Disposition: The problem was discussed with the patient. I believe she does have a complete rupture of her biceps most likely discussed pros and cons of repairing that. The patient is certainly considering surgical repair. I've recommended that we go ahead and get an MRI is quickly as possible in the event that the end and does need to be repaired. Patient is in agreement. We will go ahead and get her scheduled for an MRI of the left elbow region to see if there is a complete tear or other injury of the distal biceps. Addendum:  THe MRI of the left elbow from [facility] from 7/16/20 showed the patient to have tendinopathy and reactive bursitis at the distal biceps. There was partial-thickness tearing of the attachment of about 25%. There is also some tendinopathy and peritendinitis around the common extensor tendon origin. A 2.25 x 1.1 x 4.5 cm benign-appearing lipoma was noted in the anteromedial aspect of the brachioradialis muscle. No osseus abnormalities were noted.

## 2020-07-21 ENCOUNTER — ANESTHESIA EVENT (OUTPATIENT)
Dept: SURGERY | Age: 55
End: 2020-07-21
Payer: COMMERCIAL

## 2020-07-22 ENCOUNTER — HOSPITAL ENCOUNTER (OUTPATIENT)
Age: 55
Setting detail: OUTPATIENT SURGERY
Discharge: HOME | End: 2020-07-22
Attending: ORTHOPAEDIC SURGERY | Admitting: ORTHOPAEDIC SURGERY
Payer: COMMERCIAL

## 2020-07-22 ENCOUNTER — ANESTHESIA (OUTPATIENT)
Dept: SURGERY | Age: 55
End: 2020-07-22
Payer: COMMERCIAL

## 2020-07-22 VITALS
WEIGHT: 178 LBS | RESPIRATION RATE: 16 BRPM | SYSTOLIC BLOOD PRESSURE: 108 MMHG | OXYGEN SATURATION: 94 % | TEMPERATURE: 98.2 F | BODY MASS INDEX: 34.76 KG/M2 | HEART RATE: 69 BPM | DIASTOLIC BLOOD PRESSURE: 66 MMHG

## 2020-07-22 DIAGNOSIS — G89.18 POST-OP PAIN: Primary | ICD-10-CM

## 2020-07-22 LAB — GLUCOSE BLD STRIP.AUTO-MCNC: 157 MG/DL (ref 65–100)

## 2020-07-22 PROCEDURE — 77030000032 HC CUF TRNQT ZIMM -B: Performed by: ORTHOPAEDIC SURGERY

## 2020-07-22 PROCEDURE — 77030018836 HC SOL IRR NACL ICUM -A: Performed by: ORTHOPAEDIC SURGERY

## 2020-07-22 PROCEDURE — 76010010054 HC POST OP PAIN BLOCK: Performed by: ORTHOPAEDIC SURGERY

## 2020-07-22 PROCEDURE — 76942 ECHO GUIDE FOR BIOPSY: CPT | Performed by: ORTHOPAEDIC SURGERY

## 2020-07-22 PROCEDURE — 77030002996 HC SUT SLK J&J -A: Performed by: ORTHOPAEDIC SURGERY

## 2020-07-22 PROCEDURE — 76060000033 HC ANESTHESIA 1 TO 1.5 HR: Performed by: ORTHOPAEDIC SURGERY

## 2020-07-22 PROCEDURE — 74011250636 HC RX REV CODE- 250/636: Performed by: ANESTHESIOLOGY

## 2020-07-22 PROCEDURE — A4565 SLINGS: HCPCS | Performed by: ORTHOPAEDIC SURGERY

## 2020-07-22 PROCEDURE — 77030002933 HC SUT MCRYL J&J -A: Performed by: ORTHOPAEDIC SURGERY

## 2020-07-22 PROCEDURE — 74011250636 HC RX REV CODE- 250/636: Performed by: ORTHOPAEDIC SURGERY

## 2020-07-22 PROCEDURE — 77030003602 HC NDL NRV BLK BBMI -B: Performed by: ANESTHESIOLOGY

## 2020-07-22 PROCEDURE — 76210000020 HC REC RM PH II FIRST 0.5 HR: Performed by: ORTHOPAEDIC SURGERY

## 2020-07-22 PROCEDURE — 76010000161 HC OR TIME 1 TO 1.5 HR INTENSV-TIER 1: Performed by: ORTHOPAEDIC SURGERY

## 2020-07-22 PROCEDURE — 82962 GLUCOSE BLOOD TEST: CPT

## 2020-07-22 PROCEDURE — 77030003690 HC NDL TAPR ASPN -A: Performed by: ORTHOPAEDIC SURGERY

## 2020-07-22 PROCEDURE — 74011250636 HC RX REV CODE- 250/636: Performed by: NURSE ANESTHETIST, CERTIFIED REGISTERED

## 2020-07-22 PROCEDURE — 77030002966 HC SUT PDS J&J -A: Performed by: ORTHOPAEDIC SURGERY

## 2020-07-22 PROCEDURE — 77030040116 HC KIT SURG BIT GD Q-FX DISP S&N -C: Performed by: ORTHOPAEDIC SURGERY

## 2020-07-22 PROCEDURE — 74011250637 HC RX REV CODE- 250/637: Performed by: ANESTHESIOLOGY

## 2020-07-22 PROCEDURE — C1713 ANCHOR/SCREW BN/BN,TIS/BN: HCPCS | Performed by: ORTHOPAEDIC SURGERY

## 2020-07-22 PROCEDURE — 74011000250 HC RX REV CODE- 250: Performed by: ANESTHESIOLOGY

## 2020-07-22 PROCEDURE — 77030033138 HC SUT PGA STRATFX J&J -B: Performed by: ORTHOPAEDIC SURGERY

## 2020-07-22 PROCEDURE — 76210000063 HC OR PH I REC FIRST 0.5 HR: Performed by: ORTHOPAEDIC SURGERY

## 2020-07-22 DEVICE — 1.8MM Q-FIX ALL SUTURE ANCHOR
Type: IMPLANTABLE DEVICE | Site: ELBOW | Status: FUNCTIONAL
Brand: Q-FIX

## 2020-07-22 RX ORDER — SODIUM CHLORIDE, SODIUM LACTATE, POTASSIUM CHLORIDE, CALCIUM CHLORIDE 600; 310; 30; 20 MG/100ML; MG/100ML; MG/100ML; MG/100ML
100 INJECTION, SOLUTION INTRAVENOUS CONTINUOUS
Status: DISCONTINUED | OUTPATIENT
Start: 2020-07-22 | End: 2020-07-22 | Stop reason: HOSPADM

## 2020-07-22 RX ORDER — SODIUM CHLORIDE 0.9 % (FLUSH) 0.9 %
5-40 SYRINGE (ML) INJECTION EVERY 8 HOURS
Status: DISCONTINUED | OUTPATIENT
Start: 2020-07-22 | End: 2020-07-22 | Stop reason: HOSPADM

## 2020-07-22 RX ORDER — HALOPERIDOL 5 MG/ML
1 INJECTION INTRAMUSCULAR
Status: DISCONTINUED | OUTPATIENT
Start: 2020-07-22 | End: 2020-07-22 | Stop reason: HOSPADM

## 2020-07-22 RX ORDER — FENTANYL CITRATE 50 UG/ML
100 INJECTION, SOLUTION INTRAMUSCULAR; INTRAVENOUS
Status: COMPLETED | OUTPATIENT
Start: 2020-07-22 | End: 2020-07-22

## 2020-07-22 RX ORDER — CEFAZOLIN SODIUM/WATER 2 G/20 ML
2 SYRINGE (ML) INTRAVENOUS
Status: COMPLETED | OUTPATIENT
Start: 2020-07-22 | End: 2020-07-22

## 2020-07-22 RX ORDER — FLUMAZENIL 0.1 MG/ML
0.2 INJECTION INTRAVENOUS
Status: DISCONTINUED | OUTPATIENT
Start: 2020-07-22 | End: 2020-07-22 | Stop reason: HOSPADM

## 2020-07-22 RX ORDER — DIPHENHYDRAMINE HYDROCHLORIDE 50 MG/ML
12.5 INJECTION, SOLUTION INTRAMUSCULAR; INTRAVENOUS
Status: DISCONTINUED | OUTPATIENT
Start: 2020-07-22 | End: 2020-07-22 | Stop reason: HOSPADM

## 2020-07-22 RX ORDER — SODIUM CHLORIDE, SODIUM LACTATE, POTASSIUM CHLORIDE, CALCIUM CHLORIDE 600; 310; 30; 20 MG/100ML; MG/100ML; MG/100ML; MG/100ML
75 INJECTION, SOLUTION INTRAVENOUS CONTINUOUS
Status: DISCONTINUED | OUTPATIENT
Start: 2020-07-22 | End: 2020-07-22 | Stop reason: HOSPADM

## 2020-07-22 RX ORDER — OXYCODONE HYDROCHLORIDE 5 MG/1
5 TABLET ORAL
Qty: 30 TAB | Refills: 0 | Status: SHIPPED | OUTPATIENT
Start: 2020-07-22 | End: 2020-07-27

## 2020-07-22 RX ORDER — OXYCODONE HYDROCHLORIDE 5 MG/1
5 TABLET ORAL
Status: DISCONTINUED | OUTPATIENT
Start: 2020-07-22 | End: 2020-07-22 | Stop reason: HOSPADM

## 2020-07-22 RX ORDER — CELECOXIB 200 MG/1
200 CAPSULE ORAL ONCE
Status: DISCONTINUED | OUTPATIENT
Start: 2020-07-22 | End: 2020-07-22

## 2020-07-22 RX ORDER — METOPROLOL SUCCINATE 50 MG/1
50 TABLET, EXTENDED RELEASE ORAL ONCE
Status: COMPLETED | OUTPATIENT
Start: 2020-07-22 | End: 2020-07-22

## 2020-07-22 RX ORDER — ONDANSETRON 8 MG/1
8 TABLET, ORALLY DISINTEGRATING ORAL
Qty: 12 TAB | Refills: 1 | Status: SHIPPED | OUTPATIENT
Start: 2020-07-22 | End: 2021-07-08 | Stop reason: ALTCHOICE

## 2020-07-22 RX ORDER — MIDAZOLAM HYDROCHLORIDE 1 MG/ML
INJECTION, SOLUTION INTRAMUSCULAR; INTRAVENOUS AS NEEDED
Status: DISCONTINUED | OUTPATIENT
Start: 2020-07-22 | End: 2020-07-22 | Stop reason: HOSPADM

## 2020-07-22 RX ORDER — FAMOTIDINE 20 MG/1
20 TABLET, FILM COATED ORAL ONCE
Status: COMPLETED | OUTPATIENT
Start: 2020-07-22 | End: 2020-07-22

## 2020-07-22 RX ORDER — PROPOFOL 10 MG/ML
INJECTION, EMULSION INTRAVENOUS AS NEEDED
Status: DISCONTINUED | OUTPATIENT
Start: 2020-07-22 | End: 2020-07-22 | Stop reason: HOSPADM

## 2020-07-22 RX ORDER — MIDAZOLAM HYDROCHLORIDE 1 MG/ML
2 INJECTION, SOLUTION INTRAMUSCULAR; INTRAVENOUS
Status: COMPLETED | OUTPATIENT
Start: 2020-07-22 | End: 2020-07-22

## 2020-07-22 RX ORDER — PROPOFOL 10 MG/ML
INJECTION, EMULSION INTRAVENOUS
Status: DISCONTINUED | OUTPATIENT
Start: 2020-07-22 | End: 2020-07-22 | Stop reason: HOSPADM

## 2020-07-22 RX ORDER — MIDAZOLAM HYDROCHLORIDE 1 MG/ML
2 INJECTION, SOLUTION INTRAMUSCULAR; INTRAVENOUS
Status: DISCONTINUED | OUTPATIENT
Start: 2020-07-22 | End: 2020-07-22 | Stop reason: HOSPADM

## 2020-07-22 RX ORDER — SODIUM CHLORIDE 0.9 % (FLUSH) 0.9 %
5-40 SYRINGE (ML) INJECTION AS NEEDED
Status: DISCONTINUED | OUTPATIENT
Start: 2020-07-22 | End: 2020-07-22 | Stop reason: HOSPADM

## 2020-07-22 RX ORDER — LIDOCAINE HYDROCHLORIDE 10 MG/ML
0.1 INJECTION INFILTRATION; PERINEURAL AS NEEDED
Status: DISCONTINUED | OUTPATIENT
Start: 2020-07-22 | End: 2020-07-22 | Stop reason: HOSPADM

## 2020-07-22 RX ORDER — BUPIVACAINE HYDROCHLORIDE 5 MG/ML
INJECTION, SOLUTION EPIDURAL; INTRACAUDAL
Status: COMPLETED | OUTPATIENT
Start: 2020-07-22 | End: 2020-07-22

## 2020-07-22 RX ORDER — NALOXONE HYDROCHLORIDE 0.4 MG/ML
0.1 INJECTION, SOLUTION INTRAMUSCULAR; INTRAVENOUS; SUBCUTANEOUS
Status: DISCONTINUED | OUTPATIENT
Start: 2020-07-22 | End: 2020-07-22 | Stop reason: HOSPADM

## 2020-07-22 RX ORDER — ACETAMINOPHEN 500 MG
1000 TABLET ORAL ONCE
Status: COMPLETED | OUTPATIENT
Start: 2020-07-22 | End: 2020-07-22

## 2020-07-22 RX ORDER — HYDROMORPHONE HYDROCHLORIDE 2 MG/ML
0.5 INJECTION, SOLUTION INTRAMUSCULAR; INTRAVENOUS; SUBCUTANEOUS
Status: DISCONTINUED | OUTPATIENT
Start: 2020-07-22 | End: 2020-07-22 | Stop reason: HOSPADM

## 2020-07-22 RX ADMIN — BUPIVACAINE HYDROCHLORIDE 10 ML: 5 INJECTION, SOLUTION EPIDURAL; INTRACAUDAL; PERINEURAL at 11:18

## 2020-07-22 RX ADMIN — PROPOFOL 100 MCG/KG/MIN: 10 INJECTION, EMULSION INTRAVENOUS at 13:08

## 2020-07-22 RX ADMIN — MIDAZOLAM 2 MG: 1 INJECTION INTRAMUSCULAR; INTRAVENOUS at 11:11

## 2020-07-22 RX ADMIN — PROPOFOL 50 MG: 10 INJECTION, EMULSION INTRAVENOUS at 13:08

## 2020-07-22 RX ADMIN — SODIUM CHLORIDE, SODIUM LACTATE, POTASSIUM CHLORIDE, AND CALCIUM CHLORIDE 100 ML/HR: 600; 310; 30; 20 INJECTION, SOLUTION INTRAVENOUS at 10:40

## 2020-07-22 RX ADMIN — MIDAZOLAM 2 MG: 1 INJECTION INTRAMUSCULAR; INTRAVENOUS at 13:03

## 2020-07-22 RX ADMIN — FAMOTIDINE 20 MG: 20 TABLET, FILM COATED ORAL at 11:04

## 2020-07-22 RX ADMIN — METOPROLOL SUCCINATE 50 MG: 50 TABLET, EXTENDED RELEASE ORAL at 11:22

## 2020-07-22 RX ADMIN — ACETAMINOPHEN 1000 MG: 500 TABLET, FILM COATED ORAL at 11:04

## 2020-07-22 RX ADMIN — FENTANYL CITRATE 50 MCG: 50 INJECTION, SOLUTION INTRAMUSCULAR; INTRAVENOUS at 11:11

## 2020-07-22 RX ADMIN — Medication 2 G: at 13:03

## 2020-07-22 NOTE — INTERVAL H&P NOTE
Update History & Physical    The Patient's History and Physical of July 9, 2020,    was reviewed with the patient and I examined the patient. There was no change. The surgical site was confirmed by the patient and me. Pt is alert and oriented. Chest: Clear w/o SOB. C/V:  RR&R    Plan:  The risk, benefits, expected outcome, and alternative to the recommended procedure have been discussed with the patient. Patient understands and wants to proceed with repair of her left distal biceps tendon. I discussed the possibility of post op numbness or tingling in the distal forearm as well as other possible complication of loss of motion, and injury to nerves and vessels, infection and hematoma.     Electronically signed by Tianna Daniels MD on 7/22/2020 at 10:41 AM

## 2020-07-22 NOTE — ANESTHESIA POSTPROCEDURE EVALUATION
Procedure(s):  LEFT ELBOW REPAIR DISTAL BICEPS TENDON. regional, total IV anesthesia    Anesthesia Post Evaluation      Multimodal analgesia: multimodal analgesia used between 6 hours prior to anesthesia start to PACU discharge  Patient location during evaluation: PACU  Patient participation: complete - patient participated  Level of consciousness: awake  Pain management: adequate  Airway patency: patent  Anesthetic complications: no  Cardiovascular status: acceptable  Respiratory status: spontaneous ventilation and acceptable  Hydration status: acceptable  Post anesthesia nausea and vomiting:  none      INITIAL Post-op Vital signs:   Vitals Value Taken Time   /69 7/22/2020  2:27 PM   Temp 36.7 °C (98.1 °F) 7/22/2020  2:19 PM   Pulse 80 7/22/2020  2:28 PM   Resp 16 7/22/2020  2:27 PM   SpO2 94 % 7/22/2020  2:28 PM   Vitals shown include unvalidated device data.

## 2020-07-22 NOTE — BRIEF OP NOTE
Brief Postoperative Note    Patient: [facility]  YOB: 1965  MRN: [id number]    Date of Procedure: 7/22/2020     Pre-Op Diagnosis: Biceps tendon tear [D11.427B]    Post-Op Diagnosis: Same as preoperative diagnosis. Procedure(s):  LEFT ELBOW REPAIR DISTAL BICEPS TENDON   [CPT 46678]    Surgeon(s): Krystle Edwards MD    Surgical Assistant: None    Anesthesia: Regional     Estimated Blood Loss (mL): less than 50     Complications: None    Specimens: * No specimens in log *     Implants:   Implant Name Type Inv.  Item Serial No.  Lot No. LRB No. Used Action   ANCHOR SUT Q-FIX ALL 1.8MM -- Q-FIX - [id number]  ANCHOR SUT Q-FIX ALL 1.8MM -- Q-FIX  [employer] [id number] Left 1 Implanted   ANCHOR SUT Q-FIX ALL 1.8MM -- Q-FIX - [id number]  ANCHOR SUT Q-FIX ALL 1.8MM -- Q-FIX  [employer] [id number] Left 2 Implanted       Drains: * No LDAs found *    Findings: Approximately 50 % tear of the anterior distal biceps with a good bit of tendenosis    Electronically Signed by Dionne Staples MD on 7/22/2020 at 2:26 PM

## 2020-07-22 NOTE — ANESTHESIA PREPROCEDURE EVALUATION
Anesthetic History     PONV          Review of Systems / Medical History  Patient summary reviewed and pertinent labs reviewed    Pulmonary        Sleep apnea: CPAP      Pertinent negatives: Smoker: Quit in 2007.      Neuro/Psych   Within defined limits           Cardiovascular    Hypertension: well controlled              Exercise tolerance: >4 METS     GI/Hepatic/Renal     GERD: well controlled           Endo/Other    Diabetes: well controlled, type 2    Obesity and arthritis     Other Findings              Physical Exam    Airway  Mallampati: II  TM Distance: 4 - 6 cm  Neck ROM: normal range of motion   Mouth opening: Normal     Cardiovascular  Regular rate and rhythm,  S1 and S2 normal,  no murmur, click, rub, or gallop  Rhythm: regular  Rate: normal         Dental  No notable dental hx       Pulmonary  Breath sounds clear to auscultation               Abdominal         Other Findings            Anesthetic Plan    ASA: 2  Anesthesia type: regional and total IV anesthesia - supraclavicular block      Post-op pain plan if not by surgeon: peripheral nerve block single    Induction: Intravenous  Anesthetic plan and risks discussed with: Patient and Spouse

## 2020-07-22 NOTE — ANESTHESIA PROCEDURE NOTES
Peripheral Block    Start time: 7/22/2020 11:14 AM  End time: 7/22/2020 11:18 AM  Performed by: Eros Florian MD  Authorized by: Eros Florian MD       Pre-procedure:    Indications: at surgeon's request and post-op pain management    Preanesthetic Checklist: patient identified, risks and benefits discussed, site marked, timeout performed, anesthesia consent given and patient being monitored    Timeout Time: 11:11          Block Type:   Block Type:  Supraclavicular  Laterality:  Left  Monitoring:  Standard ASA monitoring, continuous pulse ox, frequent vital sign checks, heart rate, responsive to questions and oxygen  Injection Technique:  Single shot  Procedures: ultrasound guided and nerve stimulator    Patient Position: seated  Prep: chlorhexidine    Location:  Supraclavicular  Needle Type:  Stimuplex  Needle Localization:  Nerve stimulator and ultrasound guidance  Motor Response: minimal motor response >0.4 mA      Assessment:  Number of attempts:  1  Injection Assessment:  Incremental injection every 5 mL, local visualized surrounding nerve on ultrasound, negative aspiration for CSF, negative aspiration for blood, no paresthesia, no intravascular symptoms and ultrasound image on chart  Patient tolerance:  Patient tolerated the procedure well with no immediate complications

## 2020-07-22 NOTE — DISCHARGE INSTRUCTIONS
POST OP INSTRUCTIONS    Has appt for 8/6/20 at 8:40 AM at the [address] [place] [address] Dr Office  [phone]. Ice and elevate surgical extremity. May remove Ace wraps in 24 hours, leave the Honey comb dressing on, and shower or wash in running water. Movement: gentle range of motion as tolerated. Avoid stress on the biceps. No lifting over 5 lbs. If able to tolerate, take Acetaminophen 325 mg  2 every 4 hrs   And                                        Ibuprofen 200 mg   3 every 6 hrs or  Aleve 220 mg  2 every 12 hrs  to help with pain                                         ( Do not take Ibuprofen if taking any other anti inflamatory  drug,  NSAID, or anti arthritis drug or blood thinners)          CALL YOUR DOCTOR IF     Call your doctor if pain is NOT relieved by medication.   Excessive bleeding that does not stop after holding pressure over the area  · Temperature of 101 degrees F or above  · Excessive redness, swelling or bruising, and/ or green or yellow, smelly discharge from incision      TYPICAL SIDE EFFECTS OF PAIN MEDICATION:     Constipation: Drink lots of fluids. Over the counter stool softener if needed.    Nausea: Take pain medication with food. Call your doctor with persistent nausea. ACTIVITY  · As tolerated and as directed by your doctor. · Bathe or shower as directed by your doctor. DIET  · Day of surgery: Clear liquids until no nausea or vomiting; small portion, light diet Nobles foods (ex: baked chicken, plain rice, grits, scrambled eggs, toast). Nothing greasy, fried or spicy today. · Advance to regular diet on second day, unless your doctor orders otherwise. · If nausea and vomiting continues, call your doctor. PAIN  · Take pain medication as directed by your doctor. · DO NOT take aspirin or blood thinners unless directed by your doctor.      AFTER ANESTHESIA   · For the first 24 hours: DO NOT Drive, Drink alcoholic beverages, or Make important decisions. · Be aware of dizziness following anesthesia and while taking pain medication. DISCHARGE SUMMARY from Nurse    PATIENT INSTRUCTIONS:    After general anesthesia or intravenous sedation, for 24 hours or while taking prescription Narcotics:  · Limit your activities  · Do not drive and operate hazardous machinery  · Do not make important personal or business decisions  · Do  not drink alcoholic beverages  · If you have not urinated within 8 hours after discharge, please contact your surgeon on call. *  Please give a list of your current medications to your Primary Care Provider. *  Please update this list whenever your medications are discontinued, doses are      changed, or new medications (including over-the-counter products) are added. *  Please carry medication information at all times in case of emergency situations. Preventing Infection at Home  We care about preventing infection and avoiding the spread of germs - not only when you are in the hospital but also when you return home. When you return home from the hospital, its important to take the following steps to help prevent infection and avoid spreading germs that could infect you and others. Ask everyone in your home to follow these guidelines, too. Clean Your Hands  · Clean your hands whenever your hands are visibly dirty, before you eat, before or after touching your mouth, nose or eyes, and before preparing food. Clean them after contact with body fluids, using the restroom, touching animals or changing diapers. · When washing hands, wet them with warm water and work up a lather. Rub hands for at least 15 seconds, then rinse them and pat them dry with a clean towel or paper towel. · When using hand sanitizers, it should take about 15 seconds to rub your hands dry. If not, you probably didnt apply enough .     Cover Your Sneeze or Cough  Germs are released into the air whenever you sneeze or cough. To prevent the spread of infection:  · Turn away from other people before coughing or sneezing. · Cover your mouth or nose with a tissue when you cough or sneeze. Put the tissue in the trash. · If you dont have a tissue, cough or sneeze into your upper sleeve, not your hands. · Always clean your hands after coughing or sneezing. Care for Wounds  Your skin is your bodys first line of defense against germs, but an open wound leaves an easy way for germs to enter your body. To prevent infection:  · Clean your hands before and after changing wound dressings, and wear gloves to change dressings if recommended by your doctor. · Take special care with IV lines or other devices inserted into the body. If you must touch them, clean your hands first.  · Follow any specific instructions from your doctor to care for your wounds. Contact your doctor if you experience any signs of infection, such as fever or increased redness at the surgical or wound site. Keep a Clean Home  · Clean or wipe commonly touched hard surfaces like door handles, sinks, tabletops, phones and TV remotes. · Use products labeled disinfectant to kill harmful bacteria and viruses. · Use a clean cloth or paper towel to clean and dry surfaces. Wiping surfaces with a dirty dishcloth, sponge or towel will only spread germs. · Never share toothbrushes, henderson, drinking glasses, utensils, razor blades, face cloths or bath towels to avoid spreading germs. · Be sure that the linens that you sleep on are clean. · Keep pets away from wounds and wash your hands after touching pets, their toys or bedding. We care about you and your health. Remember, preventing infections is a team effort between you, your family, friends and health care providers.        These are general instructions for a healthy lifestyle:    No smoking/ No tobacco products/ Avoid exposure to second hand smoke    Surgeon General's Warning: Quitting smoking now greatly reduces serious risk to your health. Obesity, smoking, and sedentary lifestyle greatly increases your risk for illness    A healthy diet, regular physical exercise & weight monitoring are important for maintaining a healthy lifestyle    You may be retaining fluid if you have a history of heart failure or if you experience any of the following symptoms:  Weight gain of 3 pounds or more overnight or 5 pounds in a week, increased swelling in our hands or feet or shortness of breath while lying flat in bed. Please call your doctor as soon as you notice any of these symptoms; do not wait until your next office visit. Recognize signs and symptoms of STROKE:    F-face looks uneven    A-arms unable to move or move unevenly    S-speech slurred or non-existent    T-time-call 911 as soon as signs and symptoms begin-DO NOT go       Back to bed or wait to see if you get better-TIME IS BRAIN. Advance Care Planning  People with COVID-19 may have no symptoms, mild symptoms, such as fever, cough, and shortness of breath or they may have more severe illness, developing severe and fatal pneumonia. As a result, Advance Care Planning with attention to naming a health care decision maker (someone you trust to make healthcare decisions for you if you could not speak for yourself) and sharing other health care preferences is important BEFORE a possible health crisis. Please contact your Primary Care Provider to discuss Advance Care Planning.      Preventing the Spread of Coronavirus Disease 2019 in Homes and Residential Communities  For the most recent information go to [employer]    Prevention steps for People with confirmed or suspected COVID-19 (including persons under investigation) who do not need to be hospitalized  and   People with confirmed COVID-19 who were hospitalized and determined to be medically stable to go home    Your healthcare provider and public health staff will evaluate whether you can be cared for at home. If it is determined that you do not need to be hospitalized and can be isolated at home, you will be monitored by staff from your local or state health department. You should follow the prevention steps below until a healthcare provider or local or state health department says you can return to your normal activities. Stay home except to get medical care  People who are mildly ill with COVID-19 are able to isolate at home during their illness. You should restrict activities outside your home, except for getting medical care. Do not go to work, school, or public areas. Avoid using public transportation, ride-sharing, or taxis. Separate yourself from other people and animals in your home  People: As much as possible, you should stay in a specific room and away from other people in your home. Also, you should use a separate bathroom, if available. Animals: You should restrict contact with pets and other animals while you are sick with COVID-19, just like you would around other people. Although there have not been reports of pets or other animals becoming sick with COVID-19, it is still recommended that people sick with COVID-19 limit contact with animals until more information is known about the virus. When possible, have another member of your household care for your animals while you are sick. If you are sick with COVID-19, avoid contact with your pet, including petting, snuggling, being kissed or licked, and sharing food. If you must care for your pet or be around animals while you are sick, wash your hands before and after you interact with pets and wear a facemask. Call ahead before visiting your doctor  If you have a medical appointment, call the healthcare provider and tell them that you have or may have COVID-19.  This will help the healthcare providers office take steps to keep other people from getting infected or exposed. Wear a facemask  You should wear a facemask when you are around other people (e.g., sharing a room or vehicle) or pets and before you enter a healthcare providers office. If you are not able to wear a facemask (for example, because it causes trouble breathing), then people who live with you should not stay in the same room with you, or they should wear a facemask if they enter your room. Cover your coughs and sneezes  Cover your mouth and nose with a tissue when you cough or sneeze. Throw used tissues in a lined trash can. Immediately wash your hands with soap and water for at least 20 seconds or, if soap and water are not available, clean your hands with an alcohol-based hand  that contains at least 60% alcohol. Clean your hands often  Wash your hands often with soap and water for at least 20 seconds, especially after blowing your nose, coughing, or sneezing; going to the bathroom; and before eating or preparing food. If soap and water are not readily available, use an alcohol-based hand  with at least 60% alcohol, covering all surfaces of your hands and rubbing them together until they feel dry. Soap and water are the best option if hands are visibly dirty. Avoid touching your eyes, nose, and mouth with unwashed hands. Avoid sharing personal household items  You should not share dishes, drinking glasses, cups, eating utensils, towels, or bedding with other people or pets in your home. After using these items, they should be washed thoroughly with soap and water. Clean all high-touch surfaces everyday  High touch surfaces include counters, tabletops, doorknobs, bathroom fixtures, toilets, phones, keyboards, tablets, and bedside tables. Also, clean any surfaces that may have blood, stool, or body fluids on them. Use a household cleaning spray or wipe, according to the label instructions.  Labels contain instructions for safe and effective use of the cleaning product including precautions you should take when applying the product, such as wearing gloves and making sure you have good ventilation during use of the product. Monitor your symptoms  Seek prompt medical attention if your illness is worsening (e.g., difficulty breathing). Before seeking care, call your healthcare provider and tell them that you have, or are being evaluated for, COVID-19. Put on a facemask before you enter the facility. These steps will help the healthcare providers office to keep other people in the office or waiting room from getting infected or exposed. Ask your healthcare provider to call the local or state health department. Persons who are placed under active monitoring or facilitated self-monitoring should follow instructions provided by their local health department or occupational health professionals, as appropriate. When working with your local health department check their available hours. If you have a medical emergency and need to call 911, notify the dispatch personnel that you have, or are being evaluated for COVID-19. If possible, put on a facemask before emergency medical services arrive. Discontinuing home isolation  Patients with confirmed COVID-19 should remain under home isolation precautions until the risk of secondary transmission to others is thought to be low. The decision to discontinue home isolation precautions should be made on a case-by-case basis, in consultation with healthcare providers and state and local health departments.

## 2020-07-22 NOTE — OP NOTES
[facility]  OPERATIVE REPORT    Name:  Binh Jim  MR#:  [id number]  :  1965  ACCOUNT #:  [de-identified]  DATE OF SERVICE:  2020    PREOPERATIVE DIAGNOSIS:  Left distal biceps tendon tear (S46.219A). POSTOPERATIVE DIAGNOSIS:  Left distal biceps tendon tear (C86.683D). PROCEDURE PERFORMED:  Repair of left distal biceps tendon tear (CPT [id number]). SURGEON:  Bonny Leavitt MD    ASSISTANT:  None. ANESTHESIA:  Brachial plexus block. COMPLICATIONS:  None. SPECIMENS REMOVED:  None. IMPLANTS:  Two 1.8-mm Smith and Nephew Q-Fix anchors. ESTIMATED BLOOD LOSS:  Less than 50 mL. DRAINS:  None. PROCEDURE:  The patient was a 26-year-old lady who had recently injured the anterior aspect of her left elbow lifting. An MRI scan showed her to have a partial tear of the attachment of the distal biceps. The patient was given preoperative IV Ancef as well as a brachial plexus block. She was brought to the OR and positioned in the supine position with the left upper extremity outstretched on an arm board. The left upper extremity was prepped with ChloraPrep and draped as a sterile field. A time-out was held identifying the patient, surgeon, procedure, and operative site. The arm was exsanguinated with an Esmarch bandage and a pneumatic tourniquet inflated to 250 mmHg around the upper arm. A longitudinal incision was made over the volar radial aspect of the proximal forearm centered about three fingerbreadths distal to the flexion crease. Skin was opened and the incision carried down through the subcutaneous tissues. Some small bleeders were electrocoagulated. Care was taken to watch for the sensory branches of the median and radial nerves. The dissection was carried down alongside the radial aspect of the brachioradialis. Some of the deeper transverse veins were tied and cut for better exposure.   The patient was found to have a partial tear of the distal biceps of approximately 50% of the total diameter of the tendon. It was still attached in the deep ulnar aspect but was torn loose from the anterior part of the bicipital tuberosity. The distal end of the tendon was fairly frayed and showed some tendinosis and some surrounding synovial tissue that had formed. This was partially excised along with the periosteum off of the radial tuberosity. The deep portion of the tendon was left attached. The tendon was repaired back with two Smith and Nephew 1.8 Q-Fix anchors. One was placed near the still-attached portion of the tendon through appropriate size drill hole. The two free ends of the stitch were then woven through the tendon a bit proximal to the attachment. These were pulled snug, and with the elbow flexed about 45 degrees, the tendons were tied pulling the tendon back to the bone. An additional 1.8 Q-Fix anchor was placed then a little further anteriorly and about 1 cm distal to the first anchor. Both anchors were placed in just a unicortical fashion into the medullary canal and I did not drill through the posterior cortex with either of these. The second anchor was inserted and the two strands of 2 suture material on it were passed through the distal part of the tendon and then woven a bit further proximally and again pulled tight and tied snugging the tendon down to the bone. The patient's elbow could be passively extended fully with no disruption of the repair. The wound was thoroughly irrigated with saline to remove any bone debris and other material.  The wound was packed with wet gauze and the tourniquet deflated. After holding pressure on the wound, it was inspected. There was noted to be a freely bleeding vein proximally that was cauterized stopping the bleeding. A good check of this was made and no further bleeding was noted.   The incision was then closed with inverted sutures of 4-0 Monocryl in the subcutaneous tissue and a running 4-0 Stratafix stitch in the subcuticular layer. The wounds and skin were cleaned and dried. Short Steri-Strips were applied with Mastisol adhesive followed by a honeycomb dressing. The extremity was wrapped from the palm to above the elbow with Ace wraps for compression. The patient was placed into a sling for support of the arm. The patient tolerated the procedure well and was sent to recovery room in good condition.       MD LIANNA [email or web address]  D:  07/22/2020 14:36  T:  07/22/2020 15:22  JOB #:  [id number]  CC:  [id number]

## 2021-03-11 ENCOUNTER — TRANSCRIBE ORDER (OUTPATIENT)
Dept: SCHEDULING | Age: 56
End: 2021-03-11

## 2021-03-11 DIAGNOSIS — Z12.31 SCREENING MAMMOGRAM, ENCOUNTER FOR: Primary | ICD-10-CM

## 2021-03-17 ENCOUNTER — HOSPITAL ENCOUNTER (OUTPATIENT)
Dept: MAMMOGRAPHY | Age: 56
Discharge: HOME | End: 2021-03-17
Attending: FAMILY MEDICINE

## 2021-03-17 DIAGNOSIS — Z12.31 SCREENING MAMMOGRAM, ENCOUNTER FOR: ICD-10-CM

## 2021-04-14 ENCOUNTER — HOSPITAL ENCOUNTER (EMERGENCY)
Age: 56
Discharge: HOME | End: 2021-04-15
Attending: EMERGENCY MEDICINE
Payer: COMMERCIAL

## 2021-04-14 ENCOUNTER — APPOINTMENT (OUTPATIENT)
Dept: GENERAL RADIOLOGY | Age: 56
End: 2021-04-14
Attending: EMERGENCY MEDICINE
Payer: COMMERCIAL

## 2021-04-14 DIAGNOSIS — B34.9 VIRAL SYNDROME: ICD-10-CM

## 2021-04-14 DIAGNOSIS — R50.9 ACUTE FEBRILE ILLNESS: Primary | ICD-10-CM

## 2021-04-14 LAB
ALBUMIN SERPL-MCNC: 4 G/DL (ref 3.5–5)
ALBUMIN/GLOB SERPL: 1.1 {RATIO} (ref 1.2–3.5)
ALP SERPL-CCNC: 116 U/L (ref 50–136)
ALT SERPL-CCNC: 49 U/L (ref 12–65)
ANION GAP SERPL CALC-SCNC: 5 MMOL/L (ref 7–16)
APPEARANCE UR: NORMAL
AST SERPL-CCNC: 38 U/L (ref 15–37)
BASOPHILS # BLD: 0 K/UL (ref 0–0.2)
BASOPHILS NFR BLD: 1 % (ref 0–2)
BILIRUB SERPL-MCNC: 1.3 MG/DL (ref 0.2–1.1)
BILIRUB UR QL: NEGATIVE
BUN SERPL-MCNC: 16 MG/DL (ref 6–23)
CALCIUM SERPL-MCNC: 9.5 MG/DL (ref 8.3–10.4)
CHLORIDE SERPL-SCNC: 99 MMOL/L (ref 98–107)
CO2 SERPL-SCNC: 30 MMOL/L (ref 21–32)
COLOR UR: NORMAL
CREAT SERPL-MCNC: 0.78 MG/DL (ref 0.6–1)
DIFFERENTIAL METHOD BLD: ABNORMAL
EOSINOPHIL # BLD: 0.1 K/UL (ref 0–0.8)
EOSINOPHIL NFR BLD: 1 % (ref 0.5–7.8)
ERYTHROCYTE [DISTWIDTH] IN BLOOD BY AUTOMATED COUNT: 13.6 % (ref 11.9–14.6)
GLOBULIN SER CALC-MCNC: 3.7 G/DL (ref 2.3–3.5)
GLUCOSE SERPL-MCNC: 163 MG/DL (ref 65–100)
GLUCOSE UR STRIP.AUTO-MCNC: NEGATIVE MG/DL
HCT VFR BLD AUTO: 41.1 % (ref 35.8–46.3)
HGB BLD-MCNC: 14 G/DL (ref 11.7–15.4)
HGB UR QL STRIP: NEGATIVE
IMM GRANULOCYTES # BLD AUTO: 0 K/UL (ref 0–0.5)
IMM GRANULOCYTES NFR BLD AUTO: 0 % (ref 0–5)
KETONES UR QL STRIP.AUTO: NEGATIVE MG/DL
LEUKOCYTE ESTERASE UR QL STRIP.AUTO: NEGATIVE
LIPASE SERPL-CCNC: 128 U/L (ref 73–393)
LYMPHOCYTES # BLD: 1 K/UL (ref 0.5–4.6)
LYMPHOCYTES NFR BLD: 19 % (ref 13–44)
MCH RBC QN AUTO: 29.5 PG (ref 26.1–32.9)
MCHC RBC AUTO-ENTMCNC: 34.1 G/DL (ref 31.4–35)
MCV RBC AUTO: 86.5 FL (ref 79.6–97.8)
MONOCYTES # BLD: 0.4 K/UL (ref 0.1–1.3)
MONOCYTES NFR BLD: 8 % (ref 4–12)
NEUTS SEG # BLD: 3.8 K/UL (ref 1.7–8.2)
NEUTS SEG NFR BLD: 71 % (ref 43–78)
NITRITE UR QL STRIP.AUTO: NEGATIVE
NRBC # BLD: 0 K/UL (ref 0–0.2)
PH UR STRIP: 5.5 [PH] (ref 5–9)
PLATELET # BLD AUTO: 148 K/UL (ref 150–450)
PMV BLD AUTO: 9 FL (ref 9.4–12.3)
POTASSIUM SERPL-SCNC: 3.4 MMOL/L (ref 3.5–5.1)
PROT SERPL-MCNC: 7.7 G/DL (ref 6.3–8.2)
PROT UR STRIP-MCNC: NEGATIVE MG/DL
RBC # BLD AUTO: 4.75 M/UL (ref 4.05–5.2)
SARS-COV-2, COV2: NORMAL
SODIUM SERPL-SCNC: 134 MMOL/L (ref 136–145)
SP GR UR REFRACTOMETRY: 1.02 (ref 1–1.02)
UROBILINOGEN UR QL STRIP.AUTO: 1 EU/DL (ref 0.2–1)
WBC # BLD AUTO: 5.4 K/UL (ref 4.3–11.1)

## 2021-04-14 PROCEDURE — 96361 HYDRATE IV INFUSION ADD-ON: CPT

## 2021-04-14 PROCEDURE — 83690 ASSAY OF LIPASE: CPT

## 2021-04-14 PROCEDURE — 85025 COMPLETE CBC W/AUTO DIFF WBC: CPT

## 2021-04-14 PROCEDURE — U0005 INFEC AGEN DETEC AMPLI PROBE: HCPCS

## 2021-04-14 PROCEDURE — 71046 X-RAY EXAM CHEST 2 VIEWS: CPT

## 2021-04-14 PROCEDURE — 74011250637 HC RX REV CODE- 250/637: Performed by: EMERGENCY MEDICINE

## 2021-04-14 PROCEDURE — 87651 STREP A DNA AMP PROBE: CPT

## 2021-04-14 PROCEDURE — 74011250636 HC RX REV CODE- 250/636: Performed by: EMERGENCY MEDICINE

## 2021-04-14 PROCEDURE — 81003 URINALYSIS AUTO W/O SCOPE: CPT

## 2021-04-14 PROCEDURE — 80053 COMPREHEN METABOLIC PANEL: CPT

## 2021-04-14 PROCEDURE — 96360 HYDRATION IV INFUSION INIT: CPT

## 2021-04-14 PROCEDURE — 99283 EMERGENCY DEPT VISIT LOW MDM: CPT

## 2021-04-14 RX ORDER — ACETAMINOPHEN 500 MG
1000 TABLET ORAL
Status: COMPLETED | OUTPATIENT
Start: 2021-04-14 | End: 2021-04-14

## 2021-04-14 RX ADMIN — SODIUM CHLORIDE 1000 ML: 900 INJECTION, SOLUTION INTRAVENOUS at 22:22

## 2021-04-14 RX ADMIN — ACETAMINOPHEN 1000 MG: 500 TABLET, FILM COATED ORAL at 22:25

## 2021-04-15 VITALS
DIASTOLIC BLOOD PRESSURE: 59 MMHG | WEIGHT: 160 LBS | HEIGHT: 60 IN | TEMPERATURE: 99 F | BODY MASS INDEX: 31.41 KG/M2 | OXYGEN SATURATION: 96 % | SYSTOLIC BLOOD PRESSURE: 108 MMHG | HEART RATE: 85 BPM | RESPIRATION RATE: 16 BRPM

## 2021-04-15 LAB
SARS-COV-2, COV2: NOT DETECTED
SPECIMEN SOURCE, FCOV2M: NORMAL
STREP,MOLECULAR STRPM: NEGATIVE

## 2021-04-15 NOTE — DISCHARGE INSTRUCTIONS
Your strep test done today is negative  You will need to self quarantine until we know the final results of your COVID-19 test  Take Tylenol 1000 mg or ibuprofen 800 mg every 4-6 hours as needed for fever or body aches  Drink plenty of fluids  Follow-up with your primary care physician  Return to the ER for any new, worsening or life-threatening symptoms

## 2021-04-15 NOTE — ED PROVIDER NOTES
Patient presents to the ER with complaints of fever and fatigue. Patient states symptoms started 2 days ago. Reports some accompanying headache, sore throat, denies congestion cough or vomiting. Reports decreased appetite and myalgias. States she was seen in urgent care and tested negative for COVID-19 to rapid test as well as had a negative flu swab. Reports cultures have varied from anywhere from 100-102. The history is provided by the patient. Fever   This is a recurrent problem. The current episode started 2 days ago. The problem occurs constantly. The problem has not changed since onset. The maximum temperature noted was 100 - 100.9 F. Associated symptoms include headaches, sore throat and muscle aches. Pertinent negatives include no chest pain, no vomiting, no congestion, no cough, no mental status change, no neck pain, no rash and no urinary symptoms. She has tried nothing for the symptoms.         Past Medical History:   Diagnosis Date    Allergic rhinitis     Anxiety     Carpal tunnel syndrome     Depression     Deviated septum     Diabetes (HCC)     does not check blood sugars daily    GERD (gastroesophageal reflux disease)     contolled with medication    High cholesterol     Hypertension     Nasal obstruction     Nausea & vomiting     OSMANI (obstructive sleep apnea)     cpap       Past Surgical History:   Procedure Laterality Date    HX CARPAL TUNNEL RELEASE      bilateral    HX ORTHOPAEDIC Right     foot surgery/    HX ORTHOPAEDIC Bilateral     carpal tunnel    HX ORTHOPAEDIC      right trigger finger    HX ORTHOPAEDIC      left thumb surgery    HX OTHER SURGICAL      lipoma removed from back    HX ANSON AND BSO  2015    HX TUBAL LIGATION  1999         Family History:   Problem Relation Age of Onset    Hypertension Mother     Cancer Mother         lung    Heart Disease Mother         mi    Hypertension Father     Colon Cancer Father 58    Heart Disease Father cabg/chf    Heart Failure Father     Diabetes Brother     Depression Other     Breast Cancer Paternal Aunt 58    Malignant Hyperthermia Neg Hx     Pseudocholinesterase Deficiency Neg Hx     Delayed Awakening Neg Hx     Post-op Nausea/Vomiting Neg Hx     Emergence Delirium Neg Hx     Post-op Cognitive Dysfunction Neg Hx     Ovarian Cancer Neg Hx        Social History     Socioeconomic History    Marital status:      Spouse name: Not on file    Number of children: 2    Years of education: Not on file    Highest education level: Not on file   Occupational History    Not on file   Social Needs    Financial resource strain: Not on file    Food insecurity     Worry: Not on file     Inability: Not on file   [employer] needs     Medical: Not on file     Non-medical: Not on file   Tobacco Use    Smoking status: Former Smoker     Packs/day: 0.50     Years: 15.00     Pack years: 7.50     Quit date: 3/27/2007     Years since quittin.0    Smokeless tobacco: Never Used   Substance and Sexual Activity    Alcohol use: No    Drug use: No    Sexual activity: Yes     Birth control/protection: Surgical     Comment: tubal   Lifestyle    Physical activity     Days per week: Not on file     Minutes per session: Not on file    Stress: Not on file   Relationships    Social connections     Talks on phone: Not on file     Gets together: Not on file     Attends Restorationism service: Not on file     Active member of club or organization: Not on file     Attends meetings of clubs or organizations: Not on file     Relationship status: Not on file    Intimate partner violence     Fear of current or ex partner: Not on file     Emotionally abused: Not on file     Physically abused: Not on file     Forced sexual activity: Not on file   Other Topics Concern     Service Not Asked    Blood Transfusions Not Asked    Caffeine Concern Not Asked    Occupational Exposure Not Asked   Martin Rae Hazards Not Asked    Sleep Concern Not Asked    Stress Concern Not Asked    Weight Concern Not Asked    Special Diet No    Back Care Not Asked    Exercise No    Bike Helmet Not Asked    Seat Belt Not Asked    Self-Exams Not Asked   Social History Narrative    Not on file         ALLERGIES: Patient has no known allergies. Review of Systems   Constitutional: Positive for fatigue and fever. HENT: Positive for sore throat. Negative for congestion. Eyes: Negative for photophobia and visual disturbance. Respiratory: Negative for cough and chest tightness. Cardiovascular: Negative for chest pain. Gastrointestinal: Negative for abdominal pain and vomiting. Genitourinary: Negative for flank pain. Musculoskeletal: Negative for back pain and neck pain. Skin: Negative for color change and rash. Neurological: Positive for headaches. Negative for syncope. Hematological: Negative for adenopathy. Does not bruise/bleed easily. Psychiatric/Behavioral: Negative for behavioral problems and confusion. All other systems reviewed and are negative. Vitals:    04/14/21 2158   BP: (!) 140/90   Pulse: (!) 122   Resp: 16   Temp: 99 °F (37.2 °C)   SpO2: 96%   Weight: 72.6 kg (160 lb)   Height: 5' (1.524 m)            Physical Exam  Vitals signs and nursing note reviewed. Constitutional:       General: She is not in acute distress. Appearance: Normal appearance. She is not ill-appearing. HENT:      Head: Normocephalic and atraumatic. Right Ear: Tympanic membrane and external ear normal.      Left Ear: Tympanic membrane and external ear normal.      Nose: Nose normal. No congestion. Mouth/Throat:      Mouth: Mucous membranes are moist.      Pharynx: No oropharyngeal exudate or posterior oropharyngeal erythema. Eyes:      General:         Right eye: No discharge. Left eye: No discharge. Extraocular Movements: Extraocular movements intact.       Pupils: Pupils are equal, round, and reactive to light. Neck:      Musculoskeletal: Normal range of motion and neck supple. No neck rigidity or muscular tenderness. Cardiovascular:      Rate and Rhythm: Regular rhythm. Tachycardia present. Pulses: Normal pulses. Heart sounds: Normal heart sounds. No murmur. No friction rub. Pulmonary:      Effort: Pulmonary effort is normal. No respiratory distress. Breath sounds: No stridor. No wheezing or rhonchi. Abdominal:      General: Abdomen is flat. There is no distension. Palpations: There is no mass. Tenderness: There is no abdominal tenderness. Hernia: No hernia is present. Musculoskeletal: Normal range of motion. General: No swelling, tenderness, deformity or signs of injury. Skin:     General: Skin is warm and dry. Coloration: Skin is not pale. Findings: No bruising or rash. Neurological:      General: No focal deficit present. Mental Status: She is alert and oriented to person, place, and time. Cranial Nerves: No cranial nerve deficit. Sensory: No sensory deficit. Motor: No weakness. Coordination: Coordination normal.   Psychiatric:         Mood and Affect: Mood normal.         Behavior: Behavior normal.         Thought Content: Thought content normal.          MDM  Number of Diagnoses or Management Options  Acute febrile illness  Viral syndrome  Diagnosis management comments: Will obtain basic labs here, is a diabetic will also obtain urinalysis lactic acid. Treat with IV fluids and antipyretics    11:37 PM  Normal basic labs here. Urinalysis without any signs of infection. Patient is requesting a rapid strep swab. We will perform this as well as another COVID-19 test.  Otherwise will discharge home with viral precautions.        Amount and/or Complexity of Data Reviewed  Clinical lab tests: ordered and reviewed  Tests in the radiology section of CPT®: ordered and reviewed  Review and summarize past medical records: yes  Independent visualization of images, tracings, or specimens: yes    Risk of Complications, Morbidity, and/or Mortality  Presenting problems: moderate  Diagnostic procedures: low  Management options: moderate    Patient Progress  Patient progress: stable         Procedures               Results Include:    Recent Results (from the past 24 hour(s))   CBC WITH AUTOMATED DIFF    Collection Time: 04/14/21 10:21 PM   Result Value Ref Range    WBC 5.4 4.3 - 11.1 K/uL    RBC 4.75 4.05 - 5.2 M/uL    HGB 14.0 11.7 - 15.4 g/dL    HCT 41.1 35.8 - 46.3 %    MCV 86.5 79.6 - 97.8 FL    MCH 29.5 26.1 - 32.9 PG    MCHC 34.1 31.4 - 35.0 g/dL    RDW 13.6 11.9 - 14.6 %    PLATELET 252 (L) 660 - 450 K/uL    MPV 9.0 (L) 9.4 - 12.3 FL    ABSOLUTE NRBC 0.00 0.0 - 0.2 K/uL    DF AUTOMATED      NEUTROPHILS 71 43 - 78 %    LYMPHOCYTES 19 13 - 44 %    MONOCYTES 8 4.0 - 12.0 %    EOSINOPHILS 1 0.5 - 7.8 %    BASOPHILS 1 0.0 - 2.0 %    IMMATURE GRANULOCYTES 0 0.0 - 5.0 %    ABS. NEUTROPHILS 3.8 1.7 - 8.2 K/UL    ABS. LYMPHOCYTES 1.0 0.5 - 4.6 K/UL    ABS. MONOCYTES 0.4 0.1 - 1.3 K/UL    ABS. EOSINOPHILS 0.1 0.0 - 0.8 K/UL    ABS. BASOPHILS 0.0 0.0 - 0.2 K/UL    ABS. IMM. GRANS. 0.0 0.0 - 0.5 K/UL   METABOLIC PANEL, COMPREHENSIVE    Collection Time: 04/14/21 10:21 PM   Result Value Ref Range    Sodium 134 (L) 136 - 145 mmol/L    Potassium 3.4 (L) 3.5 - 5.1 mmol/L    Chloride 99 98 - 107 mmol/L    CO2 30 21 - 32 mmol/L    Anion gap 5 (L) 7 - 16 mmol/L    Glucose 163 (H) 65 - 100 mg/dL    BUN 16 6 - 23 MG/DL    Creatinine 0.78 0.6 - 1.0 MG/DL    GFR est AA >60 >60 ml/min/1.73m2    GFR est non-AA >60 >60 ml/min/1.73m2    Calcium 9.5 8.3 - 10.4 MG/DL    Bilirubin, total 1.3 (H) 0.2 - 1.1 MG/DL    ALT (SGPT) 49 12 - 65 U/L    AST (SGOT) 38 (H) 15 - 37 U/L    Alk.  phosphatase 116 50 - 136 U/L    Protein, total 7.7 6.3 - 8.2 g/dL    Albumin 4.0 3.5 - 5.0 g/dL    Globulin 3.7 (H) 2.3 - 3.5 g/dL    A-G Ratio 1.1 (L) 1.2 - 3.5     LIPASE    Collection Time: 04/14/21 10:21 PM   Result Value Ref Range    Lipase 128 73 - 393 U/L   URINALYSIS W/ RFLX MICROSCOPIC    Collection Time: 04/14/21 10:53 PM   Result Value Ref Range    Color MICAELA      Appearance CLOUDY      Specific gravity 1.021 1.001 - 1.023      pH (UA) 5.5 5.0 - 9.0      Protein Negative NEG mg/dL    Glucose Negative mg/dL    Ketone Negative NEG mg/dL    Bilirubin Negative NEG      Blood Negative NEG      Urobilinogen 1.0 0.2 - 1.0 EU/dL    Nitrites Negative NEG      Leukocyte Esterase Negative NEG       Voice dictation software was used during the making of this note. This software is not perfect and grammatical and other typographical errors may be present. This note has been proofread, but may still contain errors. Haja Sutton MD; 4/14/2021 @11:37 PM   ===================================================================    I wore appropriate PPE throughout this patient's ED visit.  Haja Sutton MD, 11:37 PM

## 2021-04-15 NOTE — ED TRIAGE NOTES
Pt states that she has been having a fever and chills since Monday night. Pt states that she had a rapid COVID and flu test yesterday that was negative. Pt also c/o sore throat.

## 2021-04-15 NOTE — ED NOTES
I have reviewed discharge instructions with the patient. The patient verbalized understanding. Patient left ED via Discharge Method: ambulatory to Home with self. Opportunity for questions and clarification provided. Patient given 0 scripts. To continue your aftercare when you leave the hospital, you may receive an automated call from our care team to check in on how you are doing. This is a free service and part of our promise to provide the best care and service to meet your aftercare needs.  If you have questions, or wish to unsubscribe from this service please call [phone]. Thank you for Choosing our [facility] Emergency Department.

## 2021-06-30 PROBLEM — M65.331 TRIGGER FINGER, RIGHT MIDDLE FINGER: Status: ACTIVE | Noted: 2021-06-30

## 2021-06-30 PROBLEM — M65.342 TRIGGER FINGER, LEFT RING FINGER: Status: ACTIVE | Noted: 2021-06-30

## 2021-06-30 PROBLEM — M65.332 ACQUIRED TRIGGER FINGER OF LEFT MIDDLE FINGER: Status: ACTIVE | Noted: 2021-06-30

## 2021-06-30 PROBLEM — M18.11 ARTHRITIS OF CARPOMETACARPAL (CMC) JOINT OF RIGHT THUMB: Status: ACTIVE | Noted: 2021-06-30

## 2021-07-05 ENCOUNTER — HOSPITAL ENCOUNTER (EMERGENCY)
Age: 56
Discharge: HOME | End: 2021-07-06
Attending: EMERGENCY MEDICINE
Payer: COMMERCIAL

## 2021-07-05 ENCOUNTER — APPOINTMENT (OUTPATIENT)
Dept: GENERAL RADIOLOGY | Age: 56
End: 2021-07-05
Attending: EMERGENCY MEDICINE
Payer: COMMERCIAL

## 2021-07-05 DIAGNOSIS — R07.9 CHEST PAIN, UNSPECIFIED TYPE: Primary | ICD-10-CM

## 2021-07-05 PROCEDURE — 83735 ASSAY OF MAGNESIUM: CPT

## 2021-07-05 PROCEDURE — 84484 ASSAY OF TROPONIN QUANT: CPT

## 2021-07-05 PROCEDURE — 71046 X-RAY EXAM CHEST 2 VIEWS: CPT

## 2021-07-05 PROCEDURE — 93005 ELECTROCARDIOGRAM TRACING: CPT | Performed by: EMERGENCY MEDICINE

## 2021-07-05 PROCEDURE — 80053 COMPREHEN METABOLIC PANEL: CPT

## 2021-07-05 PROCEDURE — 83690 ASSAY OF LIPASE: CPT

## 2021-07-05 PROCEDURE — 85025 COMPLETE CBC W/AUTO DIFF WBC: CPT

## 2021-07-05 PROCEDURE — 99285 EMERGENCY DEPT VISIT HI MDM: CPT

## 2021-07-05 RX ORDER — SODIUM CHLORIDE 0.9 % (FLUSH) 0.9 %
5-10 SYRINGE (ML) INJECTION AS NEEDED
Status: DISCONTINUED | OUTPATIENT
Start: 2021-07-05 | End: 2021-07-06 | Stop reason: HOSPADM

## 2021-07-05 RX ORDER — SODIUM CHLORIDE 0.9 % (FLUSH) 0.9 %
5-10 SYRINGE (ML) INJECTION EVERY 8 HOURS
Status: DISCONTINUED | OUTPATIENT
Start: 2021-07-05 | End: 2021-07-06 | Stop reason: HOSPADM

## 2021-07-06 VITALS
OXYGEN SATURATION: 97 % | DIASTOLIC BLOOD PRESSURE: 59 MMHG | BODY MASS INDEX: 31.8 KG/M2 | TEMPERATURE: 98.7 F | WEIGHT: 162 LBS | RESPIRATION RATE: 15 BRPM | HEIGHT: 60 IN | SYSTOLIC BLOOD PRESSURE: 124 MMHG | HEART RATE: 69 BPM

## 2021-07-06 LAB
ALBUMIN SERPL-MCNC: 3.7 G/DL (ref 3.5–5)
ALBUMIN/GLOB SERPL: 1 {RATIO} (ref 1.2–3.5)
ALP SERPL-CCNC: 149 U/L (ref 50–136)
ALT SERPL-CCNC: 30 U/L (ref 12–65)
ANION GAP SERPL CALC-SCNC: 9 MMOL/L (ref 7–16)
AST SERPL-CCNC: 17 U/L (ref 15–37)
ATRIAL RATE: 68 BPM
BASOPHILS # BLD: 0.1 K/UL (ref 0–0.2)
BASOPHILS NFR BLD: 1 % (ref 0–2)
BILIRUB SERPL-MCNC: 0.3 MG/DL (ref 0.2–1.1)
BUN SERPL-MCNC: 20 MG/DL (ref 6–23)
CALCIUM SERPL-MCNC: 9.7 MG/DL (ref 8.3–10.4)
CALCULATED P AXIS, ECG09: 26 DEGREES
CALCULATED R AXIS, ECG10: 15 DEGREES
CALCULATED T AXIS, ECG11: 23 DEGREES
CHLORIDE SERPL-SCNC: 103 MMOL/L (ref 98–107)
CO2 SERPL-SCNC: 26 MMOL/L (ref 21–32)
CREAT SERPL-MCNC: 0.81 MG/DL (ref 0.6–1)
DIAGNOSIS, 93000: NORMAL
DIFFERENTIAL METHOD BLD: ABNORMAL
EOSINOPHIL # BLD: 0.1 K/UL (ref 0–0.8)
EOSINOPHIL NFR BLD: 2 % (ref 0.5–7.8)
ERYTHROCYTE [DISTWIDTH] IN BLOOD BY AUTOMATED COUNT: 13.8 % (ref 11.9–14.6)
GLOBULIN SER CALC-MCNC: 3.7 G/DL (ref 2.3–3.5)
GLUCOSE SERPL-MCNC: 149 MG/DL (ref 65–100)
HCT VFR BLD AUTO: 41.3 % (ref 35.8–46.3)
HGB BLD-MCNC: 13.8 G/DL (ref 11.7–15.4)
IMM GRANULOCYTES # BLD AUTO: 0 K/UL (ref 0–0.5)
IMM GRANULOCYTES NFR BLD AUTO: 0 % (ref 0–5)
LIPASE SERPL-CCNC: 208 U/L (ref 73–393)
LYMPHOCYTES # BLD: 2.6 K/UL (ref 0.5–4.6)
LYMPHOCYTES NFR BLD: 35 % (ref 13–44)
MAGNESIUM SERPL-MCNC: 2.1 MG/DL (ref 1.8–2.4)
MCH RBC QN AUTO: 29.5 PG (ref 26.1–32.9)
MCHC RBC AUTO-ENTMCNC: 33.4 G/DL (ref 31.4–35)
MCV RBC AUTO: 88.2 FL (ref 79.6–97.8)
MONOCYTES # BLD: 0.4 K/UL (ref 0.1–1.3)
MONOCYTES NFR BLD: 5 % (ref 4–12)
NEUTS SEG # BLD: 4.4 K/UL (ref 1.7–8.2)
NEUTS SEG NFR BLD: 57 % (ref 43–78)
NRBC # BLD: 0 K/UL (ref 0–0.2)
P-R INTERVAL, ECG05: 176 MS
PLATELET # BLD AUTO: 228 K/UL (ref 150–450)
PMV BLD AUTO: 8.9 FL (ref 9.4–12.3)
POTASSIUM SERPL-SCNC: 3.9 MMOL/L (ref 3.5–5.1)
PROT SERPL-MCNC: 7.4 G/DL (ref 6.3–8.2)
Q-T INTERVAL, ECG07: 392 MS
QRS DURATION, ECG06: 80 MS
QTC CALCULATION (BEZET), ECG08: 416 MS
RBC # BLD AUTO: 4.68 M/UL (ref 4.05–5.2)
SODIUM SERPL-SCNC: 138 MMOL/L (ref 136–145)
TROPONIN-HIGH SENSITIVITY: 5.2 PG/ML (ref 0–14)
TROPONIN-HIGH SENSITIVITY: 5.4 PG/ML (ref 0–14)
VENTRICULAR RATE, ECG03: 68 BPM
WBC # BLD AUTO: 7.6 K/UL (ref 4.3–11.1)

## 2021-07-06 PROCEDURE — 74011250637 HC RX REV CODE- 250/637: Performed by: EMERGENCY MEDICINE

## 2021-07-06 PROCEDURE — 84484 ASSAY OF TROPONIN QUANT: CPT

## 2021-07-06 RX ORDER — GUAIFENESIN 100 MG/5ML
324 LIQUID (ML) ORAL
Status: COMPLETED | OUTPATIENT
Start: 2021-07-06 | End: 2021-07-06

## 2021-07-06 RX ORDER — NITROGLYCERIN 0.4 MG/1
0.4 TABLET SUBLINGUAL
Status: DISCONTINUED | OUTPATIENT
Start: 2021-07-06 | End: 2021-07-06 | Stop reason: HOSPADM

## 2021-07-06 RX ADMIN — NITROGLYCERIN 0.4 MG: 0.4 TABLET SUBLINGUAL at 01:12

## 2021-07-06 RX ADMIN — ASPIRIN 324 MG: 81 TABLET, CHEWABLE ORAL at 01:12

## 2021-07-06 NOTE — DISCHARGE INSTRUCTIONS
Follow-up with [facility]. Call the office tomorrow if you have not yet heard back from them. Take aspirin 81 mg daily until you see the cardiologist.  Return to the emergency department if your symptoms worsen.

## 2021-07-06 NOTE — ED PROVIDER NOTES
Patient has a history of type 2 diabetes, hypertension, hyperlipidemia, GERD and obstructive sleep apnea who presents with chest tightness. She states she was lying down watching a movie around 9:30 PM tonight when she started having left shoulder discomfort. She then shortly after that started having some left arm numbness. She checked her blood pressure, it was 150/105 which is very high for her. She got concerned about this and decided to come to the ER. On her way to the ER, she developed midsternal nonradiating chest tightness. She also had some mild shortness of breath. The discomfort seems to be slightly better when she sits up and worse when she lies down. She denies any recent fever or URI symptoms. She states coronary artery disease runs in the family though she has no known disease.   She has seen [facility] cardiology in the past, had a stress test back in 2016 which was normal.           Past Medical History:   Diagnosis Date    Allergic rhinitis     Anxiety     Carpal tunnel syndrome     Depression     Deviated septum     Diabetes (HCC)     does not check blood sugars daily    GERD (gastroesophageal reflux disease)     contolled with medication    High cholesterol     Hypertension     Nasal obstruction     Nausea & vomiting     OSMANI (obstructive sleep apnea)     cpap       Past Surgical History:   Procedure Laterality Date    HX CARPAL TUNNEL RELEASE      bilateral    HX ORTHOPAEDIC Right     foot surgery/    HX ORTHOPAEDIC Bilateral     carpal tunnel    HX ORTHOPAEDIC      right trigger finger    HX ORTHOPAEDIC      left thumb surgery    HX OTHER SURGICAL      lipoma removed from back    HX ANSON AND BSO  2015    HX TUBAL LIGATION  1999         Family History:   Problem Relation Age of Onset    Hypertension Mother     Cancer Mother         lung    Heart Disease Mother         mi    Hypertension Father     Colon Cancer Father 58    Heart Disease Father         cabg/chf    Heart Failure Father     Diabetes Brother     Depression Other     Breast Cancer Paternal Aunt 58    Malignant Hyperthermia Neg Hx     Pseudocholinesterase Deficiency Neg Hx     Delayed Awakening Neg Hx     Post-op Nausea/Vomiting Neg Hx     Emergence Delirium Neg Hx     Post-op Cognitive Dysfunction Neg Hx     Ovarian Cancer Neg Hx        Social History     Socioeconomic History    Marital status:      Spouse name: Not on file    Number of children: 2    Years of education: Not on file    Highest education level: Not on file   Occupational History    Not on file   Tobacco Use    Smoking status: Former Smoker     Packs/day: 0.50     Years: 15.00     Pack years: 7.50     Quit date: 3/27/2007     Years since quittin.2    Smokeless tobacco: Never Used   Substance and Sexual Activity    Alcohol use: No    Drug use: No    Sexual activity: Yes     Birth control/protection: Surgical     Comment: tubal   Other Topics Concern     Service Not Asked    Blood Transfusions Not Asked    Caffeine Concern Not Asked    Occupational Exposure Not Asked    Hobby Hazards Not Asked    Sleep Concern Not Asked    Stress Concern Not Asked    Weight Concern Not Asked    Special Diet No    Back Care Not Asked    Exercise No    Bike Helmet Not Asked    Seat Belt Not Asked    Self-Exams Not Asked   Social History Narrative    Not on file     Social Determinants of Health     Financial Resource Strain:     Difficulty of Paying Living Expenses:    Food Insecurity:     Worried About Running Out of Food in the Last Year:     Ran Out of Food in the Last Year:    Transportation Needs:     Lack of Transportation (Medical):      Lack of Transportation (Non-Medical):    Physical Activity:     Days of Exercise per Week:     Minutes of Exercise per Session:    Stress:     Feeling of Stress :    Social Connections:     Frequency of Communication with Friends and Family:     Frequency of Social Gatherings with Friends and Family:     Attends Scientologist Services:     Active Member of Clubs or Organizations:     Attends Club or Organization Meetings:     Marital Status:    Intimate Partner Violence:     Fear of Current or Ex-Partner:     Emotionally Abused:     Physically Abused:     Sexually Abused: ALLERGIES: Patient has no known allergies. Review of Systems   Constitutional: Negative for chills and fever. Respiratory: Positive for chest tightness and shortness of breath. Gastrointestinal: Negative for nausea and vomiting. All other systems reviewed and are negative. Vitals:    07/05/21 2304 07/05/21 2335   BP: 112/66 133/63   Pulse: 69    Resp: 16    Temp: 98.7 °F (37.1 °C)    SpO2: 97%    Weight: 73.5 kg (162 lb)    Height: 5' (1.524 m)             Physical Exam  Vitals and nursing note reviewed. Constitutional:       Appearance: Normal appearance. She is well-developed. HENT:      Head: Normocephalic and atraumatic. Eyes:      Conjunctiva/sclera: Conjunctivae normal.      Pupils: Pupils are equal, round, and reactive to light. Cardiovascular:      Rate and Rhythm: Normal rate and regular rhythm. Pulmonary:      Effort: Pulmonary effort is normal. No respiratory distress. Breath sounds: No wheezing or rales. Musculoskeletal:         General: No tenderness. Cervical back: Normal range of motion and neck supple. Right lower leg: No edema. Left lower leg: No edema. Skin:     General: Skin is warm and dry. Neurological:      Mental Status: She is alert and oriented to person, place, and time. Psychiatric:         Behavior: Behavior normal.          MDM  Number of Diagnoses or Management Options  Chest pain, unspecified type: new and requires workup  Diagnosis management comments: 6/17/16 stress test:  1. Stress EKG: Normal.   2. SPECT Perfusion Imaging: Normal Perfusion.    3. LV Systolic Function is  normal.     2:17 AM discussed results with patient, unremarkable work-up. She has a low risk story, no known cardiac disease. Heart score is 2. Her contact information has been sent to [facility] for close outpatient follow-up. She was instructed to take a baby aspirin daily until she sees the cardiologist.  She had no significant improvement in her pain after the nitroglycerin.        Amount and/or Complexity of Data Reviewed  Clinical lab tests: ordered and reviewed  Tests in the radiology section of CPT®: ordered and reviewed  Tests in the medicine section of CPT®: ordered and reviewed  Decide to obtain previous medical records or to obtain history from someone other than the patient: yes  Review and summarize past medical records: yes    Risk of Complications, Morbidity, and/or Mortality  Presenting problems: moderate  Diagnostic procedures: moderate  Management options: moderate    Patient Progress  Patient progress: stable         Procedures

## 2021-07-06 NOTE — ED NOTES
I have reviewed discharge instructions with the patient. The patient verbalized understanding. Patient left ED via Discharge Method: ambulatory to Home with . Opportunity for questions and clarification provided. Patient given 0 scripts. To continue your aftercare when you leave the hospital, you may receive an automated call from our care team to check in on how you are doing. This is a free service and part of our promise to provide the best care and service to meet your aftercare needs.  If you have questions, or wish to unsubscribe from this service please call [phone]. Thank you for Choosing our [facility] Emergency Department.

## 2021-07-06 NOTE — ED TRIAGE NOTES
Pt states her BP was 156/109 tonight, also c/o left arm soreness and chest tightness that started thirty minutes ago and shortness of breath.

## 2021-07-08 ENCOUNTER — HOSPITAL ENCOUNTER (OUTPATIENT)
Dept: LAB | Age: 56
Discharge: HOME | End: 2021-07-08
Payer: COMMERCIAL

## 2021-07-08 DIAGNOSIS — E78.5 DYSLIPIDEMIA: ICD-10-CM

## 2021-07-08 DIAGNOSIS — I10 ESSENTIAL HYPERTENSION: ICD-10-CM

## 2021-07-08 PROBLEM — I20.8 ANGINA AT REST (HCC): Status: ACTIVE | Noted: 2021-07-08

## 2021-07-08 LAB
25(OH)D3 SERPL-MCNC: 21.2 NG/ML (ref 30–100)
ALBUMIN SERPL-MCNC: 4 G/DL (ref 3.5–5)
ALBUMIN/GLOB SERPL: 1.1 {RATIO} (ref 1.2–3.5)
ALP SERPL-CCNC: 125 U/L (ref 50–136)
ALT SERPL-CCNC: 34 U/L (ref 12–65)
ANION GAP SERPL CALC-SCNC: 4 MMOL/L (ref 7–16)
AST SERPL-CCNC: 15 U/L (ref 15–37)
BASOPHILS # BLD: 0.1 K/UL (ref 0–0.2)
BASOPHILS NFR BLD: 1 % (ref 0–2)
BILIRUB SERPL-MCNC: 0.4 MG/DL (ref 0.2–1.1)
BNP SERPL-MCNC: 12 PG/ML (ref 5–125)
BUN SERPL-MCNC: 22 MG/DL (ref 6–23)
CALCIUM SERPL-MCNC: 9.5 MG/DL (ref 8.3–10.4)
CHLORIDE SERPL-SCNC: 105 MMOL/L (ref 98–107)
CHOLEST SERPL-MCNC: 180 MG/DL
CO2 SERPL-SCNC: 28 MMOL/L (ref 21–32)
CREAT SERPL-MCNC: 1.33 MG/DL (ref 0.6–1)
DIFFERENTIAL METHOD BLD: ABNORMAL
EOSINOPHIL # BLD: 0.1 K/UL (ref 0–0.8)
EOSINOPHIL NFR BLD: 2 % (ref 0.5–7.8)
ERYTHROCYTE [DISTWIDTH] IN BLOOD BY AUTOMATED COUNT: 13.9 % (ref 11.9–14.6)
EST. AVERAGE GLUCOSE BLD GHB EST-MCNC: 123 MG/DL
GLOBULIN SER CALC-MCNC: 3.8 G/DL (ref 2.3–3.5)
GLUCOSE SERPL-MCNC: 77 MG/DL (ref 65–100)
HBA1C MFR BLD: 5.9 % (ref 4.2–6.3)
HCT VFR BLD AUTO: 41.7 % (ref 35.8–46.3)
HDLC SERPL-MCNC: 51 MG/DL (ref 40–60)
HDLC SERPL: 3.5 {RATIO}
HGB BLD-MCNC: 13.5 G/DL (ref 11.7–15.4)
IMM GRANULOCYTES # BLD AUTO: 0 K/UL (ref 0–0.5)
IMM GRANULOCYTES NFR BLD AUTO: 0 % (ref 0–5)
LDLC SERPL CALC-MCNC: 88.6 MG/DL
LYMPHOCYTES # BLD: 2.2 K/UL (ref 0.5–4.6)
LYMPHOCYTES NFR BLD: 30 % (ref 13–44)
MCH RBC QN AUTO: 29.3 PG (ref 26.1–32.9)
MCHC RBC AUTO-ENTMCNC: 32.4 G/DL (ref 31.4–35)
MCV RBC AUTO: 90.7 FL (ref 79.6–97.8)
MONOCYTES # BLD: 0.5 K/UL (ref 0.1–1.3)
MONOCYTES NFR BLD: 7 % (ref 4–12)
NEUTS SEG # BLD: 4.4 K/UL (ref 1.7–8.2)
NEUTS SEG NFR BLD: 60 % (ref 43–78)
NRBC # BLD: 0 K/UL (ref 0–0.2)
PLATELET # BLD AUTO: 235 K/UL (ref 150–450)
PMV BLD AUTO: 9.3 FL (ref 9.4–12.3)
POTASSIUM SERPL-SCNC: 4.1 MMOL/L (ref 3.5–5.1)
PROT SERPL-MCNC: 7.8 G/DL (ref 6.3–8.2)
RBC # BLD AUTO: 4.6 M/UL (ref 4.05–5.2)
SODIUM SERPL-SCNC: 137 MMOL/L (ref 136–145)
T4 FREE SERPL-MCNC: 0.9 NG/DL (ref 0.9–1.8)
TRIGL SERPL-MCNC: 202 MG/DL (ref 35–150)
TSH SERPL DL<=0.005 MIU/L-ACNC: 1.94 UIU/ML (ref 0.36–3.74)
VLDLC SERPL CALC-MCNC: 40.4 MG/DL (ref 6–23)
WBC # BLD AUTO: 7.2 K/UL (ref 4.3–11.1)

## 2021-07-08 PROCEDURE — 82306 VITAMIN D 25 HYDROXY: CPT

## 2021-07-08 PROCEDURE — 83036 HEMOGLOBIN GLYCOSYLATED A1C: CPT

## 2021-07-08 PROCEDURE — 84439 ASSAY OF FREE THYROXINE: CPT

## 2021-07-08 PROCEDURE — 36415 COLL VENOUS BLD VENIPUNCTURE: CPT

## 2021-07-08 PROCEDURE — 80053 COMPREHEN METABOLIC PANEL: CPT

## 2021-07-08 PROCEDURE — 85025 COMPLETE CBC W/AUTO DIFF WBC: CPT

## 2021-07-08 PROCEDURE — 84443 ASSAY THYROID STIM HORMONE: CPT

## 2021-07-08 PROCEDURE — 80061 LIPID PANEL: CPT

## 2021-07-08 PROCEDURE — 83880 ASSAY OF NATRIURETIC PEPTIDE: CPT

## 2021-07-15 NOTE — PROGRESS NOTES
[facility] 0700. Patient verified using . Patient instructed to bring all home medications in labeled bottles on the day of procedure. NPO status reinforced. Patient informed to take a full dose aspirin 325mg  or 81 mg x 4 on the day of procedure. Patient instructed to TAKE celexa, metoprolol and prilosec. HOLD all other medications. Instructed they can take all other medications including vitamins & supplements. Patient verbalizes understanding of all instructions & denies any questions at this time.

## 2021-07-16 ENCOUNTER — HOSPITAL ENCOUNTER (OUTPATIENT)
Age: 56
Setting detail: OUTPATIENT SURGERY
Discharge: HOME | End: 2021-07-16
Attending: INTERNAL MEDICINE | Admitting: INTERNAL MEDICINE
Payer: COMMERCIAL

## 2021-07-16 ENCOUNTER — APPOINTMENT (OUTPATIENT)
Dept: NON INVASIVE DIAGNOSTICS | Age: 56
End: 2021-07-16
Attending: INTERNAL MEDICINE
Payer: COMMERCIAL

## 2021-07-16 VITALS
BODY MASS INDEX: 35.28 KG/M2 | SYSTOLIC BLOOD PRESSURE: 111 MMHG | DIASTOLIC BLOOD PRESSURE: 66 MMHG | OXYGEN SATURATION: 96 % | HEART RATE: 72 BPM | WEIGHT: 175 LBS | HEIGHT: 59 IN

## 2021-07-16 DIAGNOSIS — I20.8 ANGINA AT REST (HCC): ICD-10-CM

## 2021-07-16 LAB
ANION GAP SERPL CALC-SCNC: 3 MMOL/L (ref 7–16)
ATRIAL RATE: 67 BPM
BUN SERPL-MCNC: 25 MG/DL (ref 6–23)
CALCIUM SERPL-MCNC: 9.7 MG/DL (ref 8.3–10.4)
CALCULATED P AXIS, ECG09: 21 DEGREES
CALCULATED R AXIS, ECG10: 11 DEGREES
CALCULATED T AXIS, ECG11: 33 DEGREES
CHLORIDE SERPL-SCNC: 105 MMOL/L (ref 98–107)
CO2 SERPL-SCNC: 32 MMOL/L (ref 21–32)
CREAT SERPL-MCNC: 0.81 MG/DL (ref 0.6–1)
DIAGNOSIS, 93000: NORMAL
ECHO AO ASC DIAM: 2.93 CM
ECHO AO SINUS VALSALVA DIAM: 2.97 CM
ECHO AV AREA PEAK VELOCITY: 2.52 CM2
ECHO AV AREA VTI: 2.56 CM2
ECHO AV AREA VTI: 2.56 CM2
ECHO AV AREA VTI: 2.64 CM2
ECHO AV AREA/BSA PEAK VELOCITY: 1.4 CM2/M2
ECHO AV MEAN GRADIENT: 3.41 MMHG
ECHO AV PEAK GRADIENT: 6.93 MMHG
ECHO AV PEAK VELOCITY: 131.65 CM/S
ECHO AV VTI: 31.63 CM
ECHO IVC PROX: 2.13 CM
ECHO LA AREA 4C: 17.1 CM2
ECHO LA MAJOR AXIS: 3.16 CM
ECHO LA MINOR AXIS: 1.82 CM
ECHO LA VOL 2C: 55.52 ML (ref 22–52)
ECHO LA VOL 4C: 43.68 ML (ref 22–52)
ECHO LA VOL BP: 53.82 ML (ref 22–52)
ECHO LA VOL/BSA BIPLANE: 30.93 ML/M2 (ref 16–28)
ECHO LA VOLUME INDEX A2C: 31.91 ML/M2 (ref 16–28)
ECHO LA VOLUME INDEX A4C: 25.1 ML/M2 (ref 16–28)
ECHO LV E' LATERAL VELOCITY: 8.03 CM/S
ECHO LV E' SEPTAL VELOCITY: 6.69 CM/S
ECHO LV INTERNAL DIMENSION DIASTOLIC: 3.55 CM (ref 3.9–5.3)
ECHO LV INTERNAL DIMENSION SYSTOLIC: 2.51 CM
ECHO LV IVSD: 0.96 CM (ref 0.6–0.9)
ECHO LV MASS 2D: 101.8 G (ref 67–162)
ECHO LV MASS INDEX 2D: 58.5 G/M2 (ref 43–95)
ECHO LV POSTERIOR WALL DIASTOLIC: 0.99 CM (ref 0.6–0.9)
ECHO LVOT DIAM: 1.98 CM
ECHO LVOT PEAK GRADIENT: 4.43 MMHG
ECHO LVOT PEAK VELOCITY: 105.27 CM/S
ECHO LVOT SV: 80.9 ML
ECHO LVOT VTI: 26.19 CM
ECHO MV A VELOCITY: 102.87 CM/S
ECHO MV E DECELERATION TIME (DT): 217.74 MS
ECHO MV E VELOCITY: 92.11 CM/S
ECHO MV E/A RATIO: 0.9
ECHO MV E/E' LATERAL: 11.47
ECHO MV E/E' RATIO (AVERAGED): 12.62
ECHO MV E/E' SEPTAL: 13.77
ECHO RV INTERNAL DIMENSION: 2.86 CM
GLUCOSE SERPL-MCNC: 132 MG/DL (ref 65–100)
LVOT MG: 2.42 MMHG
MAGNESIUM SERPL-MCNC: 2.4 MG/DL (ref 1.8–2.4)
P-R INTERVAL, ECG05: 172 MS
POTASSIUM SERPL-SCNC: 4.3 MMOL/L (ref 3.5–5.1)
Q-T INTERVAL, ECG07: 410 MS
QRS DURATION, ECG06: 72 MS
QTC CALCULATION (BEZET), ECG08: 433 MS
SODIUM SERPL-SCNC: 140 MMOL/L (ref 136–145)
VENTRICULAR RATE, ECG03: 67 BPM

## 2021-07-16 PROCEDURE — 74011250636 HC RX REV CODE- 250/636: Performed by: INTERNAL MEDICINE

## 2021-07-16 PROCEDURE — C8929 TTE W OR WO FOL WCON,DOPPLER: HCPCS

## 2021-07-16 PROCEDURE — 80048 BASIC METABOLIC PNL TOTAL CA: CPT

## 2021-07-16 PROCEDURE — 93458 L HRT ARTERY/VENTRICLE ANGIO: CPT | Performed by: INTERNAL MEDICINE

## 2021-07-16 PROCEDURE — 74011000250 HC RX REV CODE- 250: Performed by: INTERNAL MEDICINE

## 2021-07-16 PROCEDURE — C1887 CATHETER, GUIDING: HCPCS | Performed by: INTERNAL MEDICINE

## 2021-07-16 PROCEDURE — 99152 MOD SED SAME PHYS/QHP 5/>YRS: CPT | Performed by: INTERNAL MEDICINE

## 2021-07-16 PROCEDURE — 74011000636 HC RX REV CODE- 636: Performed by: INTERNAL MEDICINE

## 2021-07-16 PROCEDURE — 93571 IV DOP VEL&/PRESS C FLO 1ST: CPT | Performed by: INTERNAL MEDICINE

## 2021-07-16 PROCEDURE — 74011000258 HC RX REV CODE- 258: Performed by: INTERNAL MEDICINE

## 2021-07-16 PROCEDURE — 77030015766: Performed by: INTERNAL MEDICINE

## 2021-07-16 PROCEDURE — 83735 ASSAY OF MAGNESIUM: CPT

## 2021-07-16 PROCEDURE — C1894 INTRO/SHEATH, NON-LASER: HCPCS | Performed by: INTERNAL MEDICINE

## 2021-07-16 PROCEDURE — 77030016699 HC CATH ANGI DX INFN1 CARD -A: Performed by: INTERNAL MEDICINE

## 2021-07-16 PROCEDURE — 93005 ELECTROCARDIOGRAM TRACING: CPT | Performed by: INTERNAL MEDICINE

## 2021-07-16 PROCEDURE — C1769 GUIDE WIRE: HCPCS | Performed by: INTERNAL MEDICINE

## 2021-07-16 PROCEDURE — 77030029997 HC DEV COM RDL R BND TELE -B: Performed by: INTERNAL MEDICINE

## 2021-07-16 PROCEDURE — 77030012468 HC VLV BLEEDBK CNTRL ABBT -B: Performed by: INTERNAL MEDICINE

## 2021-07-16 RX ORDER — SODIUM CHLORIDE 9 MG/ML
75 INJECTION, SOLUTION INTRAVENOUS CONTINUOUS
Status: DISCONTINUED | OUTPATIENT
Start: 2021-07-16 | End: 2021-07-16 | Stop reason: HOSPADM

## 2021-07-16 RX ORDER — SODIUM CHLORIDE 0.9 % (FLUSH) 0.9 %
5 SYRINGE (ML) INJECTION AS NEEDED
Status: DISCONTINUED | OUTPATIENT
Start: 2021-07-16 | End: 2021-07-16 | Stop reason: HOSPADM

## 2021-07-16 RX ORDER — MIDAZOLAM HYDROCHLORIDE 1 MG/ML
INJECTION, SOLUTION INTRAMUSCULAR; INTRAVENOUS AS NEEDED
Status: DISCONTINUED | OUTPATIENT
Start: 2021-07-16 | End: 2021-07-16 | Stop reason: HOSPADM

## 2021-07-16 RX ORDER — GUAIFENESIN 100 MG/5ML
324 LIQUID (ML) ORAL ONCE
Status: DISCONTINUED | OUTPATIENT
Start: 2021-07-16 | End: 2021-07-16 | Stop reason: HOSPADM

## 2021-07-16 RX ORDER — LIDOCAINE HYDROCHLORIDE 10 MG/ML
INJECTION INFILTRATION; PERINEURAL AS NEEDED
Status: DISCONTINUED | OUTPATIENT
Start: 2021-07-16 | End: 2021-07-16 | Stop reason: HOSPADM

## 2021-07-16 RX ADMIN — PERFLUTREN 1 ML: 6.52 INJECTION, SUSPENSION INTRAVENOUS at 08:51

## 2021-07-16 NOTE — Clinical Note
Contrast Dose Calculator:   Patient's age: 63.   Patient's sex: Female. Patient weight (kg) = 79.4. Creatinine level (mg/dL) = 0.81. Creatinine clearance (mL/min): 97.   Contrast concentration (mg/mL) = 370. MACD = 300 mL. Max Contrast dose per Creatinine Cl calculator = 218.25 mL.

## 2021-07-16 NOTE — PROGRESS NOTES
Terumo band completely deflated. 1205 Terumo band removed from right wrist using sterile technique. Sterile dressing applied. No signs and symptoms of bleeding, oozing or hematoma.

## 2021-07-16 NOTE — PROGRESS NOTES
R radial LHC with Dr Maico Guo  Versed 4 mg  Angiomax off at 1025  Pressure wire to the LAD  TR band 12 ml  No bleeding or hematoma noted at site. Site soft    TRANSFER - OUT REPORT:    Verbal report given to Haja(name) sindy Russ  being transferred to CPRU(unit) for routine progression of care       Report consisted of patients Situation, Background, Assessment and   Recommendations(SBAR). Information from the following report(s) Procedure Summary and MAR was reviewed with the receiving nurse. Lines:   Peripheral IV 07/16/21 Right Antecubital (Active)       Peripheral IV 07/16/21 Left Antecubital (Active)        Opportunity for questions and clarification was provided.       Patient transported with:   Registered Nurse

## 2021-07-16 NOTE — DISCHARGE INSTRUCTIONS
DO NOT TAKE METFORMIN (GLUCOPHAGE) UNTIL Sunday AFTERNOON. HEART CATHETERIZATION/ANGIOGRAPHY DISCHARGE INSTRUCTIONS    1. Check puncture site frequently for swelling or bleeding. If there is any bleeding, lie down and apply pressure over the area with a clean towel or washcloth. Notify your doctor for any redness, swelling, drainage, or oozing from the puncture site. Notify your doctor for any fever or chills. 2. If the extremity becomes cold, numb, or painful call Dr. Teena White at [phone].  3. Activity should be limited for the next 48 hours. Climb stairs as little as possible and avoid any stooping, bending, or strenuous activity for 48 hours. No heavy lifting (anything over 10 pounds) for 3 days. 4. You may resume your usual diet. Drink more fluids than usual.  5. Have a responsible person drive you home and stay with you for at least 24 hours after your heart catheterization/angiography. 6. You may remove bandage from your Right  Arm in 24 hours. You may shower in 24 hours. No tub baths, hot tubs, or swimming for 1 week. Do not place any lotions, creams, powders, or ointments over puncture site for 1 week. You may place a clean band-aid over the puncture site each day for 5 days. Change daily. I have read the above instructions and have had the opportunity to ask questions.       Patient: ________________________   Date: 7/16/2021    Witness: _______________________   Date: 7/16/2021

## 2021-07-16 NOTE — ROUTINE PROCESS
Patient received to [address] [place] [address] room # 9  Ambulatory from [facility]. Patient scheduled for [facility] today with Dr Tyrone Tadeo. Procedure reviewed & questions answered, voiced good understanding consent obtained & placed on chart. All medications and medical history reviewed. Will prep patient per orders. Patient & family updated on plan of care.       The patient has a fraility score of 3-MANAGING WELL, based on ability to perform ADLS by self

## 2021-07-16 NOTE — PROGRESS NOTES
Report received from [address] [place] [address]. Procedural findings communicated. Intra procedural  medication administration reviewed. Progression of care discussed.      Patient received into [id number] [facility] 1 post sheath removal.     Access site without bleeding or swelling yes    Dressing dry and intact yes    Patient instructed to limit movement to right upper extremity    Routine post procedural vital signs and site assessment initiated yes

## 2021-07-16 NOTE — PROGRESS NOTES
Patient up to bedside, vital signs and site stable. Patient ambulated to bathroom without difficulty. Patient voided without difficulty. Vascular site stable. Discharge instructions and home medications reviewed with patient. Time allowed for questions and answers. Site stable after ambulation. Peripheral IV sites dc'd without difficulty with tips intact. [id number] Patient discharged to home with family.

## 2021-07-16 NOTE — H&P
Maria Del Rosario Galvan MD   Physician   Specialty:  Cardiology   Progress Notes       Signed   Encounter Date:  7/8/2021               Expand AllCollapse All      []Hide copied text    []Rajiv for details  [facility], [address] [place] [address], [address], [address] [place] [address]  PHONE: [phone]     SUBJECTIVE: Garo Russ (1965) is a 63 y.o. female seen for a follow up visit regarding the following:          ICD-10-CM ICD-9-CM   1. Essential hypertension  I10 401.9   2. Dyslipidemia  E78.5 272.4   3. Depression, unspecified depression type  F32.9 311   4. Precordial pain  R07.2 786.51      Patient was seen in 2016 for cardiac symptoms she underwent a normal noninvasive work-up at that time and returns nowFor elevated blood pressure and recurrent chest pressure and numbness and pain in the arm. Felt heart racing. bp shot up to 160 range. Arm became numb. Went to the er.workup negative. Has had some mild continued symptoms.   Has upcoming hand surgery for trigger finger release.           Past Medical History, Past Surgical History, Family history, Social History, and Medications were all reviewed with the patient today and updated as necessary.        No Known Allergies       Past Medical History:   Diagnosis Date    Allergic rhinitis      Anxiety      Carpal tunnel syndrome      Depression      Deviated septum      Diabetes ([facility] [address]       does not check blood sugars daily    GERD (gastroesophageal reflux disease)       contolled with medication    High cholesterol      Hypertension      Nasal obstruction      Nausea & vomiting      OSMANI (obstructive sleep apnea)       cpap            Past Surgical History:   Procedure Laterality Date    HX CARPAL TUNNEL RELEASE         bilateral    HX ORTHOPAEDIC Right       foot surgery/    HX ORTHOPAEDIC Bilateral       carpal tunnel    HX ORTHOPAEDIC         right trigger finger    HX ORTHOPAEDIC         left thumb surgery    HX OTHER SURGICAL         lipoma removed from back    HX ANSON AND BSO   2015    HX TUBAL LIGATION               Family History   Problem Relation Age of Onset    Hypertension Mother      Cancer Mother           lung    Heart Disease Mother           mi    Hypertension Father      Colon Cancer Father 58    Heart Disease Father           cabg/chf    Heart Failure Father      Diabetes Brother      Depression Other      Breast Cancer Paternal Aunt 58    Malignant Hyperthermia Neg Hx      Pseudocholinesterase Deficiency Neg Hx      Delayed Awakening Neg Hx      Post-op Nausea/Vomiting Neg Hx      Emergence Delirium Neg Hx      Post-op Cognitive Dysfunction Neg Hx      Ovarian Cancer Neg Hx        Social History            Tobacco Use    Smoking status: Former Smoker       Packs/day: 0.50       Years: 15.00       Pack years: 7.50       Quit date: 3/27/2007       Years since quittin.2    Smokeless tobacco: Never Used   Substance Use Topics    Alcohol use: No      Pt does   have history of early onset cad or heart failure in immediate family members     Current Outpatient Medications:     aspirin delayed-release 81 mg tablet, Take 81 mg by mouth daily. , Disp: , Rfl:     loratadine (Claritin) 10 mg tablet, Take 10 mg by mouth., Disp: , Rfl:     atorvastatin (LIPITOR) 10 mg tablet, Take  by mouth daily. , Disp: , Rfl:     omeprazole (PRILOSEC) 20 mg capsule, Take 20 mg by mouth daily. , Disp: , Rfl:     metoprolol succinate (TOPROL-XL) 50 mg XL tablet, TAKE ONE TABLET BY MOUTH ONCE DAILY, Disp: 30 Tab, Rfl: 10    naproxen (NAPROSYN) 500 mg tablet, 1 po bid, Disp: 60 Tab, Rfl: 11    metFORMIN (GLUCOPHAGE) 500 mg tablet, 1 po bid (Patient taking differently: Take 500 mg by mouth daily.), Disp: 180 Tab, Rfl: 3    citalopram (CELEXA) 40 mg tablet, 1.5 po qd (Patient taking differently: Take 20 mg by mouth daily.), Disp: 135 Tab, Rfl: 3    ALPRAZolam (XANAX) 1 mg tablet, 1 po tid prn, Disp: 90 Tab, Rfl: 0    cpap machine kit, by Does Not Apply route.  autopap 7-15, Disp: , Rfl:            ROS:  Review of Systems - General ROS: negative for - chills, fatigue or fever  Hematological and Lymphatic ROS: negative for - bleeding problems, bruising or jaundice  Respiratory ROS: positive for - shortness of breath  Cardiovascular ROS: positive for - chest pain and dyspnea on exertion  Gastrointestinal ROS: no abdominal pain, change in bowel habits, or black or bloody stools  Neurological ROS: no TIA or stroke symptoms  All other systems negative.            Wt Readings from Last 3 Encounters:   07/08/21 175 lb (79.4 kg)   07/05/21 162 lb (73.5 kg)   04/14/21 160 lb (72.6 kg)          Temp Readings from Last 3 Encounters:   07/05/21 98.7 °F (37.1 °C)   04/14/21 99 °F (37.2 °C)   07/22/20 98.2 °F (36.8 °C)          BP Readings from Last 3 Encounters:   07/08/21 124/68   07/06/21 (!) 124/59   04/15/21 (!) 108/59          Pulse Readings from Last 3 Encounters:   07/08/21 63   07/06/21 69   04/15/21 85               PHYSICAL EXAM:  Visit Vitals  /68   Pulse 63   Ht 4' 11.5\" (1.511 m)   Wt 175 lb (79.4 kg)   LMP 09/29/2014   BMI 34.75 kg/m²         Physical Examination: General appearance - alert, well appearing, and in no distress  Mental status - alert, oriented to person, place, and time  Eyes - pupils equal and reactive, extraocular eye movements intact  Neck/lymph - supple, no significant adenopathy  Chest/lungs - clear to auscultation, no wheezes, rales or rhonchi, symmetric air entry  Heart/CV - normal rate, regular rhythm, normal S1, S2, no murmurs, rubs, clicks or gallops  Abdomen/GI - soft, nontender, nondistended, no masses or organomegaly  Musculoskeletal - no joint tenderness, deformity or swelling  Extremities - peripheral pulses normal, no pedal edema, no clubbing or cyanosis  Skin - normal coloration and turgor, no rashes, no suspicious skin lesions noted     EKG: normal EKG, normal sinus rhythm, unchanged from previous tracings.                                                                                        Medications reviewed and questions answered     Recent Results          Recent Results (from the past 672 hour(s))   EKG, 12 LEAD, INITIAL     Collection Time: 07/05/21 11:10 PM   Result Value Ref Range     Ventricular Rate 68 BPM     Atrial Rate 68 BPM     P-R Interval 176 ms     QRS Duration 80 ms     Q-T Interval 392 ms     QTC Calculation (Bezet) 416 ms     Calculated P Axis 26 degrees     Calculated R Axis 15 degrees     Calculated T Axis 23 degrees     Diagnosis           Normal sinus rhythm  Cannot rule out Anterior infarct , age undetermined  Abnormal ECG  No previous ECGs available  Confirmed by ST JARED CORLEY MD (), ELVA SCHWAB ([id number]) on 7/6/2021 8:42:38 AM      TROPONIN-HIGH SENSITIVITY     Collection Time: 07/05/21 11:43 PM   Result Value Ref Range     Troponin-High Sensitivity 5.2 0 - 14 pg/mL   METABOLIC PANEL, COMPREHENSIVE     Collection Time: 07/05/21 11:43 PM   Result Value Ref Range     Sodium 138 136 - 145 mmol/L     Potassium 3.9 3.5 - 5.1 mmol/L     Chloride 103 98 - 107 mmol/L     CO2 26 21 - 32 mmol/L     Anion gap 9 7 - 16 mmol/L     Glucose 149 (H) 65 - 100 mg/dL     BUN 20 6 - 23 MG/DL     Creatinine 0.81 0.6 - 1.0 MG/DL     GFR est AA >60 >60 ml/min/1.73m2     GFR est non-AA >60 >60 ml/min/1.73m2     Calcium 9.7 8.3 - 10.4 MG/DL     Bilirubin, total 0.3 0.2 - 1.1 MG/DL     ALT (SGPT) 30 12 - 65 U/L     AST (SGOT) 17 15 - 37 U/L     Alk.  phosphatase 149 (H) 50 - 136 U/L     Protein, total 7.4 6.3 - 8.2 g/dL     Albumin 3.7 3.5 - 5.0 g/dL     Globulin 3.7 (H) 2.3 - 3.5 g/dL     A-G Ratio 1.0 (L) 1.2 - 3.5     LIPASE     Collection Time: 07/05/21 11:43 PM   Result Value Ref Range     Lipase 208 73 - 393 U/L   MAGNESIUM     Collection Time: 07/05/21 11:43 PM   Result Value Ref Range     Magnesium 2.1 1.8 - 2.4 mg/dL   CBC WITH AUTOMATED DIFF     Collection Time: 07/05/21 11:44 PM   Result Value Ref Range     WBC 7.6 4.3 - 11.1 K/uL     RBC 4.68 4.05 - 5.2 M/uL     HGB 13.8 11.7 - 15.4 g/dL     HCT 41.3 35.8 - 46.3 %     MCV 88.2 79.6 - 97.8 FL     MCH 29.5 26.1 - 32.9 PG     MCHC 33.4 31.4 - 35.0 g/dL     RDW 13.8 11.9 - 14.6 %     PLATELET 607 216 - 162 K/uL     MPV 8.9 (L) 9.4 - 12.3 FL     ABSOLUTE NRBC 0.00 0.0 - 0.2 K/uL     DF AUTOMATED       NEUTROPHILS 57 43 - 78 %     LYMPHOCYTES 35 13 - 44 %     MONOCYTES 5 4.0 - 12.0 %     EOSINOPHILS 2 0.5 - 7.8 %     BASOPHILS 1 0.0 - 2.0 %     IMMATURE GRANULOCYTES 0 0.0 - 5.0 %     ABS. NEUTROPHILS 4.4 1.7 - 8.2 K/UL     ABS. LYMPHOCYTES 2.6 0.5 - 4.6 K/UL     ABS. MONOCYTES 0.4 0.1 - 1.3 K/UL     ABS. EOSINOPHILS 0.1 0.0 - 0.8 K/UL     ABS. BASOPHILS 0.1 0.0 - 0.2 K/UL     ABS. IMM. GRANS. 0.0 0.0 - 0.5 K/UL   TROPONIN-HIGH SENSITIVITY     Collection Time: 07/06/21  1:16 AM   Result Value Ref Range     Troponin-High Sensitivity 5.4 0 - 14 pg/mL               Lab Results   Component Value Date/Time     Cholesterol, total 193 11/28/2018 12:18 PM     HDL Cholesterol 47 11/28/2018 12:18 PM     LDL, calculated 112 (H) 11/28/2018 12:18 PM     VLDL, calculated 34 11/28/2018 12:18 PM     Triglyceride 172 (H) 11/28/2018 12:18 PM     CHOL/HDL Ratio 4.9 05/18/2016 08:18 AM            ASSESSMENT and PLAN           1. Essential hypertension  The current medical regimen is effective;  continue present plan and medications.        2. Dyslipidemia  The current medical regimen is effective;  continue present plan and medications.        3.  Depression, unspecified depression type  The current medical regimen is effective;  continue present plan and medications.        Discussed work-up options including conservative management and doing no testing this would impact and almost certainly get her upcoming hand surgery canceled we have discussed need complete noninvasive testing which again would likely lead to her hand surgery being canceled or rescheduled we would be able to rule out with him the degree of sensitivity significant i.e. greater than 80% blockage I have also discussed a heart cath and echo option which would answer the question of is there any plaque forming at all or if there is presence of coronary vasomotor dysfunction                 Orders Placed This Encounter    CBC WITH AUTOMATED DIFF       Standing Status:   Future       Standing Expiration Date:   6/4/5343    METABOLIC PANEL, COMPREHENSIVE       Standing Status:   Future       Standing Expiration Date:   1/6/2022    VITAMIN D, 25 HYDROXY       Standing Status:   Future       Standing Expiration Date:   1/8/2022    TSH 3RD GENERATION       Standing Status:   Future       Standing Expiration Date:   1/6/2022    T4, FREE       Standing Status:   Future       Standing Expiration Date:   1/6/2022    NT-PRO BNP       Standing Status:   Future       Standing Expiration Date:   1/8/2022    LIPID PANEL       Fasting sample requested       Standing Status:   Future       Standing Expiration Date:   1/6/2022    HEMOGLOBIN A1C WITH EAG       Standing Status:   Future       Standing Expiration Date:   1/8/2022    AMB POC EKG ROUTINE W/ 12 LEADS, INTER & REP       Order Specific Question:   Reason for Exam:       Answer:   See diagnosis    aspirin delayed-release 81 mg tablet       Sig: Take 81 mg by mouth daily.         Pt is instructed to follow all appropriate cardiac risk factor recommendations and to be compliant with meds and testing. Instructed to follow up appropriately and seek urgent medical care if acute or unstable issues arise. Results of some tests may be viewed thru [address] but this does not substitute for follow up with MD. If follow up is not scheduled pt is instructed to call for follow up        Pt set up for procedure. Risks benefits and alternatives discussed. Pt agrees to proceed.  Risks of bleeding infection emergent surgical procedure loss of life or limb renal failure and other known risks discussed.  Pt agrees to proceed and agrees to sign consent form.              Shakir Zamudio MD  7/8/2021  1:50 PM            Electronically signed by Sonny Denis MD at 07/08/21 1413  Note Details    Author Sonny Denis MD File Time 07/08/21 1418   Author Type Physician Status Signed   Last  Sonny Denis MD Specialty Cardiology    Office Visit on 7/8/2021     Office Visit on 7/8/2021        Detailed Report      Note shared with patient

## 2021-07-29 ENCOUNTER — HOSPITAL ENCOUNTER (OUTPATIENT)
Dept: LAB | Age: 56
Discharge: HOME | End: 2021-07-29
Attending: INTERNAL MEDICINE
Payer: COMMERCIAL

## 2021-07-29 DIAGNOSIS — Z79.899 LONG-TERM USE OF HIGH-RISK MEDICATION: ICD-10-CM

## 2021-07-29 DIAGNOSIS — I10 ESSENTIAL HYPERTENSION: ICD-10-CM

## 2021-07-29 DIAGNOSIS — R82.998 BROWN-COLORED URINE: ICD-10-CM

## 2021-07-29 LAB
ALBUMIN SERPL-MCNC: 4.1 G/DL (ref 3.5–5)
ALBUMIN/GLOB SERPL: 1 {RATIO} (ref 1.2–3.5)
ALP SERPL-CCNC: 126 U/L (ref 50–130)
ALT SERPL-CCNC: 38 U/L (ref 12–65)
ANION GAP SERPL CALC-SCNC: 3 MMOL/L (ref 7–16)
APPEARANCE UR: CLEAR
AST SERPL-CCNC: 23 U/L (ref 15–37)
BASOPHILS # BLD: 0 K/UL (ref 0–0.2)
BASOPHILS NFR BLD: 1 % (ref 0–2)
BILIRUB SERPL-MCNC: 0.5 MG/DL (ref 0.2–1.1)
BILIRUB UR QL: NEGATIVE
BUN SERPL-MCNC: 19 MG/DL (ref 6–23)
CALCIUM SERPL-MCNC: 10 MG/DL (ref 8.3–10.4)
CHLORIDE SERPL-SCNC: 101 MMOL/L (ref 98–107)
CK SERPL-CCNC: 67 U/L (ref 21–215)
CO2 SERPL-SCNC: 36 MMOL/L (ref 21–32)
COLOR UR: YELLOW
CREAT SERPL-MCNC: 0.74 MG/DL (ref 0.6–1)
DIFFERENTIAL METHOD BLD: ABNORMAL
EOSINOPHIL # BLD: 0.1 K/UL (ref 0–0.8)
EOSINOPHIL NFR BLD: 2 % (ref 0.5–7.8)
ERYTHROCYTE [DISTWIDTH] IN BLOOD BY AUTOMATED COUNT: 13.4 % (ref 11.9–14.6)
GLOBULIN SER CALC-MCNC: 4 G/DL (ref 2.3–3.5)
GLUCOSE SERPL-MCNC: 102 MG/DL (ref 65–100)
GLUCOSE UR STRIP.AUTO-MCNC: NEGATIVE MG/DL
HCT VFR BLD AUTO: 44 % (ref 35.8–46.3)
HGB BLD-MCNC: 14.9 G/DL (ref 11.7–15.4)
HGB UR QL STRIP: NEGATIVE
IMM GRANULOCYTES # BLD AUTO: 0 K/UL (ref 0–0.5)
IMM GRANULOCYTES NFR BLD AUTO: 0 % (ref 0–5)
KETONES UR QL STRIP.AUTO: NEGATIVE MG/DL
LEUKOCYTE ESTERASE UR QL STRIP.AUTO: NEGATIVE
LYMPHOCYTES # BLD: 2 K/UL (ref 0.5–4.6)
LYMPHOCYTES NFR BLD: 32 % (ref 13–44)
MCH RBC QN AUTO: 30.5 PG (ref 26.1–32.9)
MCHC RBC AUTO-ENTMCNC: 33.9 G/DL (ref 31.4–35)
MCV RBC AUTO: 90 FL (ref 79.6–97.8)
MONOCYTES # BLD: 0.3 K/UL (ref 0.1–1.3)
MONOCYTES NFR BLD: 5 % (ref 4–12)
NEUTS SEG # BLD: 3.7 K/UL (ref 1.7–8.2)
NEUTS SEG NFR BLD: 59 % (ref 43–78)
NITRITE UR QL STRIP.AUTO: NEGATIVE
NRBC # BLD: 0 K/UL (ref 0–0.2)
PH UR STRIP: 6.5 [PH] (ref 5–9)
PLATELET # BLD AUTO: 247 K/UL (ref 150–450)
PMV BLD AUTO: 9.1 FL (ref 9.4–12.3)
POTASSIUM SERPL-SCNC: 3.5 MMOL/L (ref 3.5–5.1)
PROT SERPL-MCNC: 8.1 G/DL (ref 6.3–8.2)
PROT UR STRIP-MCNC: NEGATIVE MG/DL
RBC # BLD AUTO: 4.89 M/UL (ref 4.05–5.2)
SODIUM SERPL-SCNC: 140 MMOL/L (ref 136–145)
SP GR UR REFRACTOMETRY: 1.01 (ref 1–1.02)
UROBILINOGEN UR QL STRIP.AUTO: 1 EU/DL (ref 0.2–1)
WBC # BLD AUTO: 6.2 K/UL (ref 4.3–11.1)

## 2021-07-29 PROCEDURE — 81003 URINALYSIS AUTO W/O SCOPE: CPT

## 2021-07-29 PROCEDURE — 80053 COMPREHEN METABOLIC PANEL: CPT

## 2021-07-29 PROCEDURE — 36415 COLL VENOUS BLD VENIPUNCTURE: CPT

## 2021-07-29 PROCEDURE — 87086 URINE CULTURE/COLONY COUNT: CPT

## 2021-07-29 PROCEDURE — 82550 ASSAY OF CK (CPK): CPT

## 2021-07-29 PROCEDURE — 85025 COMPLETE CBC W/AUTO DIFF WBC: CPT

## 2021-07-31 LAB
BACTERIA SPEC CULT: NORMAL
BACTERIA SPEC CULT: NORMAL
SERVICE CMNT-IMP: NORMAL

## 2022-02-02 ENCOUNTER — HOSPITAL ENCOUNTER (OUTPATIENT)
Dept: LAB | Age: 57
Discharge: HOME | End: 2022-02-02
Payer: COMMERCIAL

## 2022-02-02 DIAGNOSIS — G25.81 RLS (RESTLESS LEGS SYNDROME): ICD-10-CM

## 2022-02-02 LAB — FERRITIN SERPL-MCNC: 37 NG/ML (ref 8–388)

## 2022-02-02 PROCEDURE — 36415 COLL VENOUS BLD VENIPUNCTURE: CPT

## 2022-02-02 PROCEDURE — 82728 ASSAY OF FERRITIN: CPT

## 2022-03-18 PROBLEM — E11.9 TYPE 2 DIABETES MELLITUS WITHOUT COMPLICATION, WITH LONG-TERM CURRENT USE OF INSULIN (HCC): Status: ACTIVE | Noted: 2018-02-23

## 2022-03-18 PROBLEM — Z79.4 TYPE 2 DIABETES MELLITUS WITHOUT COMPLICATION, WITH LONG-TERM CURRENT USE OF INSULIN (HCC): Status: ACTIVE | Noted: 2018-02-23

## 2022-03-18 PROBLEM — M65.331 TRIGGER FINGER, RIGHT MIDDLE FINGER: Status: ACTIVE | Noted: 2021-06-30

## 2022-03-18 PROBLEM — M18.11 ARTHRITIS OF CARPOMETACARPAL (CMC) JOINT OF RIGHT THUMB: Status: ACTIVE | Noted: 2021-06-30

## 2022-03-19 PROBLEM — M65.332 ACQUIRED TRIGGER FINGER OF LEFT MIDDLE FINGER: Status: ACTIVE | Noted: 2021-06-30

## 2022-03-19 PROBLEM — I20.8 ANGINA AT REST (HCC): Status: ACTIVE | Noted: 2021-07-08

## 2022-03-20 PROBLEM — M65.342 TRIGGER FINGER, LEFT RING FINGER: Status: ACTIVE | Noted: 2021-06-30

## 2022-06-17 ENCOUNTER — TELEPHONE (OUTPATIENT)
Dept: CARDIOLOGY CLINIC | Age: 57
End: 2022-06-17

## 2022-06-17 NOTE — TELEPHONE ENCOUNTER
She has been tired all the time. She noticed lately her LE's feels like her skin is really tight and she has periodic dizziness. BP is good. She has some aching/tightnes supper chest into shoulder off/on. I offered next available w/ in July but she would like to be seen sooner. Made appt. Monday /SA w/

## 2022-06-20 ENCOUNTER — OFFICE VISIT (OUTPATIENT)
Dept: CARDIOLOGY CLINIC | Age: 57
End: 2022-06-20
Payer: COMMERCIAL

## 2022-06-20 VITALS
DIASTOLIC BLOOD PRESSURE: 76 MMHG | HEIGHT: 59 IN | WEIGHT: 173.6 LBS | HEART RATE: 68 BPM | BODY MASS INDEX: 35 KG/M2 | SYSTOLIC BLOOD PRESSURE: 126 MMHG

## 2022-06-20 DIAGNOSIS — I25.10 CORONARY ARTERY DISEASE INVOLVING NATIVE CORONARY ARTERY OF NATIVE HEART WITHOUT ANGINA PECTORIS: ICD-10-CM

## 2022-06-20 DIAGNOSIS — E66.9 OBESITY (BMI 30-39.9): ICD-10-CM

## 2022-06-20 DIAGNOSIS — R53.82 CHRONIC FATIGUE: ICD-10-CM

## 2022-06-20 DIAGNOSIS — I10 PRIMARY HYPERTENSION: Primary | ICD-10-CM

## 2022-06-20 DIAGNOSIS — G47.33 OSA (OBSTRUCTIVE SLEEP APNEA): ICD-10-CM

## 2022-06-20 DIAGNOSIS — Z79.4 TYPE 2 DIABETES MELLITUS WITHOUT COMPLICATION, WITH LONG-TERM CURRENT USE OF INSULIN (HCC): ICD-10-CM

## 2022-06-20 DIAGNOSIS — E11.9 TYPE 2 DIABETES MELLITUS WITHOUT COMPLICATION, WITH LONG-TERM CURRENT USE OF INSULIN (HCC): ICD-10-CM

## 2022-06-20 PROCEDURE — 93000 ELECTROCARDIOGRAM COMPLETE: CPT | Performed by: INTERNAL MEDICINE

## 2022-06-20 PROCEDURE — 99214 OFFICE O/P EST MOD 30 MIN: CPT | Performed by: INTERNAL MEDICINE

## 2022-06-20 ASSESSMENT — ENCOUNTER SYMPTOMS
SHORTNESS OF BREATH: 0
ABDOMINAL PAIN: 0

## 2022-06-20 NOTE — PROGRESS NOTES
[address], [address] [facility], [address] [place] [address]  PHONE: [phone]    Keanu Grant  1965      SUBJECTIVE:   Keanu Grant is a 61 y.o. female seen for a follow up visit regarding the following:     Chief Complaint   Patient presents with    Fatigue    Shortness of Breath       HPI:    74-year-old female worked in today for just some fatigue and minor swelling. She sees . She has had chest pain in the past and had a heart cath July of last year showing some mild nonobstructive disease in LAD she had FFR which was negative. LV function is normal LVEDP was apparently high. BNP that was 12 and echo showed normal systolic diastolic function. She has been on beta-blocker therapy and she is been on ARB with diuretic. She has noted a little more tightness in her skin but today it looks good. She has not had any major chest discomfort. She is a little more fatigue nothing specific. No tachycardia. Does have sleep apnea using his CPAP      Past Medical History, Past Surgical History, Family history, Social History, and Medications were all reviewed with the patient today and updated as necessary.        Not on File  Past Medical History:   Diagnosis Date    Allergic rhinitis     Anxiety     Carpal tunnel syndrome     Depression     Deviated septum     Diabetes (HCC)     does not check blood sugars daily    GERD (gastroesophageal reflux disease)     contolled with medication    High cholesterol     Hypertension     Nasal obstruction     Nausea & vomiting     GILBERT (obstructive sleep apnea)     cpap     Past Surgical History:   Procedure Laterality Date    CARPAL TUNNEL RELEASE      bilateral    ORTHOPEDIC SURGERY      right trigger finger    ORTHOPEDIC SURGERY Right     foot surgery/    ORTHOPEDIC SURGERY Bilateral     carpal tunnel    ORTHOPEDIC SURGERY      left thumb surgery    OTHER SURGICAL HISTORY      lipoma removed from back    TOTAL ABDOMINAL HYSTERECTOMY W/ BILATERAL SALPINGOOPHORECTOMY  2015    TUBAL LIGATION  1999     Family History   Problem Relation Age of Onset    Diabetes Brother     Post-op Nausea/Vomiting Neg Hx     Heart Disease Father         cabg/chf    Colon Cancer Father 58    Hypertension Father     Heart Disease Mother         mi    Cancer Mother         lung    Hypertension Mother     Ovarian Cancer Neg Hx     Post-op Cognitive Dysfunction Neg Hx     Emergence Delirium Neg Hx     Depression Other     Breast Cancer Paternal Aunt 58    Malig Hypertherm Neg Hx     Pseudochol. Deficiency Neg Hx     Delayed Awakening Neg Hx     Heart Failure Father       Social History     Tobacco Use    Smoking status: Former Smoker     Packs/day: 0.50     Quit date: 3/27/2007     Years since quitting: 15.2    Smokeless tobacco: Never Used   Substance Use Topics    Alcohol use: No       ROS:    Review of Systems   Constitutional: Positive for malaise/fatigue. Negative for decreased appetite. Cardiovascular: Positive for leg swelling. Negative for chest pain, dyspnea on exertion and irregular heartbeat. Respiratory: Negative for shortness of breath. Hematologic/Lymphatic: Negative for bleeding problem. Gastrointestinal: Negative for abdominal pain. PHYSICAL EXAM:    /76   Pulse 68   Ht 4' 11\" (1.499 m)   Wt 173 lb 9.6 oz (78.7 kg)   BMI 35.06 kg/m²        Wt Readings from Last 3 Encounters:   06/20/22 173 lb 9.6 oz (78.7 kg)   03/28/22 167 lb 3.2 oz (75.8 kg)   02/02/22 166 lb 8 oz (75.5 kg)     BP Readings from Last 3 Encounters:   06/20/22 126/76   03/28/22 108/68   02/02/22 120/60         Physical Exam  Constitutional:       General: She is not in acute distress. Cardiovascular:      Rate and Rhythm: Normal rate and regular rhythm. Heart sounds: No murmur heard. No gallop. Pulmonary:      Effort: Pulmonary effort is normal.      Breath sounds: No rales. Abdominal:      General: Abdomen is flat. Tenderness: There is no abdominal tenderness. Musculoskeletal:         General: No swelling. Skin:     General: Skin is warm. Neurological:      Mental Status: She is alert. Medical problems and test results were reviewed with the patient today. Results for orders placed or performed in visit on 06/20/22   EKG 12 Lead    Impression    Normal sinus rhythm rate of 69. Poor wave progression. No ST  ST changes. Normal intervals. Lab Results   Component Value Date     07/29/2021    K 3.5 07/29/2021     07/29/2021    CO2 36 07/29/2021    BUN 19 07/29/2021    GFRAA >60 07/29/2021     Lab Results   Component Value Date    CHOL 180 07/08/2021    HDL 51 07/08/2021         ASSESSMENT and PLAN    Kacie Martinez was seen today for fatigue and shortness of breath. Diagnoses and all orders for this visit:    -     EKG 12 Lead    Primary hypertension well blood pressure is currently stable    GILBERT (obstructive sleep apnea) she continue her CPAP. Obesity (BMI 30-39.9) discussed with her starting to exercise and try to get that    Coronary artery disease involving native coronary artery of native heart without angina pectoris EKG shows no significant changes. She had mild disease. I do not think is really any changes here at this point in time. -     EKG 12 Lead    Type 2 diabetes mellitus without complication, with long-term current use of insulin (HCC) continue medications    Chronic fatigue. Patient's had some mild fatigue nothing specific. There is no clear obvious reason from a cardiac standpoint beta-blocker could make her somewhat tired but she has been on it. I would not change at this point in time    Overall cardiac status seems to be stable at this point time. I would not make any major changes in her meds or any more test right now. She can follow-up with Dr. Tello Sharma    [unfilled]      No follow-up provider specified.     Cheyanne Bashir MD  6/20/2022  11:41 AM

## 2022-07-13 NOTE — PROGRESS NOTES
[address] [place] [address] Court, [address] [facility], [place]  [phone]    Patient Name:  [facility]  YOB: 1965      Office Visit 7/14/2022    CHIEF COMPLAINT:    Chief Complaint   Patient presents with    CPAP/BiPAP    Follow-up    Sleep Apnea         HISTORY OF PRESENT ILLNESS:      The patient is seen today for follow-up of sleep apnea, hypersomnia. The diagnostic polysomnography in 2016 was notable for an apnea hypopnea index of 6.2 including 2 obstructive apneas and 33 hypopneas. Oxygen desaturations are low as 87% were noted with SpO2 less than 89% for a total of 0.1 minutes of the test.  Significant  cardiac arrhythmias were not evident. The patient was noted to have 0 limb movements with a limb movement arousal index being about 0. She is prescribed cpap therapy with a humidifier set at 7-15cm with a nasal mask. Most recent download reveals AHI on PAP therapy is 1.7/hr. she is using and benefiting from her CPAP on a daily basis and her download indicated excellent compliance with average daily use 8 hours nightly. Patient seen in sleep Center in 2016 as well. She was diagnosed with GILBERT in 2013 and oral appliance was recommended but she did not use. She then followed up with repeat sleep study in 2016 and started on CPAP. 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 Was wondering if this related to her sleep apnea or not. She indicated that she does have questionable restrictive cardiomyopathy based on her echocardiogram finding. She indicated that there was a possible diagnosis of cardiac amyloidosis based on her echocardiogram result although the last echocardiogram result in her chart from last year does not suggest that.   I suggested to her that we need to check her thyroid functions and ask cardiology if further testing for her heart including MRI is needed to make a definite diagnosis. She had known known obstructive CAD and this been treated medically. We will adjust her setting and change to 10-15 cm based on her download to make sure her sleep apnea is adequately treated. Past Medical History:   Diagnosis Date    Allergic rhinitis     Anxiety     Carpal tunnel syndrome     Depression     Deviated septum     Diabetes ([facility])     does not check blood sugars daily    GERD (gastroesophageal reflux disease)     contolled with medication    High cholesterol     Hypertension     Nasal obstruction     Nausea & vomiting     GILBERT (obstructive sleep apnea)     cpap         Patient Active Problem List   Diagnosis    Trigger finger, right middle finger    Hoarseness    Type 2 diabetes mellitus without complication, with long-term current use of insulin ([facility])    Carpal tunnel syndrome    GILBERT (obstructive sleep apnea)    Arthritis of carpometacarpal (CMC) joint of right thumb    Acquired trigger finger of left middle finger    Nocturnal hypoxemia    Obesity (BMI 30-39. 9)    Angina at rest [facility])    Depression    Hypertension    Nasal obstruction    Hypertrophy of nasal turbinates    Anxiety    GERD (gastroesophageal reflux disease)    Allergic rhinitis    Trigger finger, left ring finger    Coronary artery disease involving native coronary artery of native heart without angina pectoris    Chronic fatigue    Hypersomnia          Past Surgical History:   Procedure Laterality Date    CARPAL TUNNEL RELEASE      bilateral    ORTHOPEDIC SURGERY      right trigger finger    ORTHOPEDIC SURGERY Right     foot surgery/    ORTHOPEDIC SURGERY Bilateral     carpal tunnel    ORTHOPEDIC SURGERY      left thumb surgery    OTHER SURGICAL HISTORY      lipoma removed from back    MAGDALENO AND BSO (CERVIX REMOVED)  2015    TUBAL LIGATION  1999       [unfilled]        Social History     Socioeconomic History    Marital status:      Spouse name: Not on file    Number of children: Not on file    Years of education: Not on file    Highest education level: Not on file   Occupational History    Not on file   Tobacco Use    Smoking status: Former Smoker     Packs/day: 0.50     Quit date: 3/27/2007     Years since quitting: 15.3    Smokeless tobacco: Never Used   Substance and Sexual Activity    Alcohol use: No    Drug use: No    Sexual activity: Not on file     Comment: tubal   Other Topics Concern    Not on file   Social History Narrative    Not on file     Social Determinants of Health     Financial Resource Strain:     Difficulty of Paying Living Expenses: Not on file   Food Insecurity:     Worried About Running Out of Food in the Last Year: Not on file    Nay of Food in the Last Year: Not on file   Transportation Needs:     Lack of Transportation (Medical): Not on file    Lack of Transportation (Non-Medical):  Not on file   Physical Activity:     Days of Exercise per Week: Not on file    Minutes of Exercise per Session: Not on file   Stress:     Feeling of Stress : Not on file   Social Connections:     Frequency of Communication with Friends and Family: Not on file    Frequency of Social Gatherings with Friends and Family: Not on file    Attends Tenriism Services: Not on file    Active Member of [address] [place] [address] [employer] or Organizations: Not on file    Attends Club or Organization Meetings: Not on file    Marital Status: Not on file   Intimate Partner Violence:     Fear of Current or Ex-Partner: Not on file    Emotionally Abused: Not on file    Physically Abused: Not on file    Sexually Abused: Not on file   Housing Stability:     Unable to Pay for Housing in the Last Year: Not on file    Number of [place] in the Last Year: Not on file    Unstable Housing in the Last Year: Not on file         Family History   Problem Relation Age of Onset    Diabetes Brother     Post-op Nausea/Vomiting Neg Hx     Heart Disease Father         cabg/chf    Colon Cancer Father 58    Hypertension Father     Heart Disease Mother         mi   Acacia Euler Cancer Mother         lung    Hypertension Mother     Ovarian Cancer Neg Hx     Post-op Cognitive Dysfunction Neg Hx     Emergence Delirium Neg Hx     Depression Other     Breast Cancer Paternal Aunt 58    Malig Hypertherm Neg Hx     Pseudochol. Deficiency Neg Hx     Delayed Awakening Neg Hx     Heart Failure Father          No Known Allergies      Current Outpatient Medications   Medication Sig    METHYLTESTOSTERONE PO Take by mouth qd    ALPRAZolam (XANAX) 1 MG tablet 1 po tid prn    aspirin 81 MG EC tablet Take 81 mg by mouth daily    atorvastatin (LIPITOR) 10 MG tablet Take 5 mg by mouth daily    cetirizine (ZYRTEC) 10 MG tablet Take 10 mg by mouth daily    citalopram (CELEXA) 40 MG tablet 20 mg daily     ferrous sulfate (IRON 325) 325 (65 Fe) MG tablet Take 325 mg by mouth daily     losartan-hydroCHLOROthiazide (HYZAAR) 50-12.5 MG per tablet Take 1 tablet by mouth daily    metoprolol succinate (TOPROL XL) 50 MG extended release tablet TAKE ONE TABLET BY MOUTH ONCE DAILY    naproxen (NAPROSYN) 500 MG tablet 1 po bid    omeprazole (PRILOSEC) 20 MG delayed release capsule Take 20 mg by mouth daily as needed     No current facility-administered medications for this visit. REVIEW OF SYSTEMS:   CONSTITUTIONAL:   There is no history of fever, chills, night sweats, weight loss, weight gain, persistent fatigue, or lethargy/hypersomnolence. CARDIAC:   No chest pain, pressure, discomfort, palpitations, orthopnea, murmurs, or edema. GI:   No dysphagia, heartburn reflux, nausea/vomiting, diarrhea, abdominal pain, or bleeding. NEURO:   There is no history of AMS, persistent headache, decreased level of consciousness, seizures, or motor or sensory deficits.       PHYSICAL EXAM:    Vitals:    07/14/22 1606   BP: 128/62   Pulse: 77   Resp: 14   Temp: 97 °F (36.1 °C)   SpO2: 96%        GENERAL APPEARANCE:   The patient is normal weight and in no respiratory distress. HEENT:   PERRL. Conjunctivae unremarkable. Nasal mucosa is without epistaxis, exudate, or polyps. Gums and dentition are unremarkable. There is severe oropharyngeal narrowing. She is Mallampati 4     NECK/LYMPHATIC:   Symmetrical with no elevation of jugular venous pulsation. Trachea midline. No thyroid enlargement. No cervical adenopathy. LUNGS:   Normal respiratory effort with symmetrical lung expansion. Breath sounds are diminished in the bases. HEART:   There is a regular rate and rhythm. No murmur, rub, or gallop. There is no edema in the lower extremities. ABDOMEN:   Soft and non-tender. No hepatosplenomegaly. Bowel sounds are normal.     NEURO:   The patient is alert and oriented to person, place, and time. Memory appears intact and mood is normal.  No gross sensorimotor deficits are present. ASSESSMENT:  (Medical Decision Making)      Diagnosis Orders   1. GILBERT (obstructive sleep apnea), on CPAP at 7-15 cm on the pressure will be adjusted to 10-15 cm with a EPR 3   DME - DURABLE MEDICAL EQUIPMENT   2. Nocturnal hypoxemia, related to sleep apnea improved with CPAP therapy   DME - DURABLE MEDICAL EQUIPMENT   3. Hypertrophy of nasal turbinates, contributing to nasal obstruction      4. Hypersomnia, hopefully will improve with the adjustment in her CPAP therapy. Use mild amount of caffeine right after lunch will help. The patient does not desire to use any stimulant at this time         PLAN:    The patient's CPAP setting will be adjusted to 10-15 cm with EPR 3    She will need to continue proper sleep hygiene and positional therapy    The patient will need to discuss with cardiology and further work-up for her cardiac process    Ask her primary care physician to consider checking her TSH level and vitamin D level again since it was low last year.     Return to sleep center in 4 months or sooner if needed      Maintain her follow-up with primary care physician and cardiology as recommended      Orders Placed This Encounter   Procedures    DME - [address] [place] [address] medical    Please make a note that the patient's CPAP setting has been changed to 10-15 cm with a EPR 3. Please continue to provide the patient with needed supplies as requested      Signed Date: 7/14/2022  Electronically Signed By: Hardik Vick MD  Electronically Dated:  7/14/2022      No orders of the defined types were placed in this encounter. Over 50% of today's office visit was spent in face to face time reviewing test results, prognosis, importance of compliance, education about disease process, benefits of medications, instructions for management of acute flare-ups, and follow up plans. Total face to face time spent with patient and charting was 30 minutes.         Hardik Vick MD  Electronically signed

## 2022-07-14 ENCOUNTER — OFFICE VISIT (OUTPATIENT)
Dept: SLEEP MEDICINE | Age: 57
End: 2022-07-14
Payer: COMMERCIAL

## 2022-07-14 VITALS
OXYGEN SATURATION: 96 % | DIASTOLIC BLOOD PRESSURE: 62 MMHG | HEIGHT: 60 IN | SYSTOLIC BLOOD PRESSURE: 128 MMHG | HEART RATE: 77 BPM | RESPIRATION RATE: 14 BRPM | WEIGHT: 174 LBS | BODY MASS INDEX: 34.16 KG/M2 | TEMPERATURE: 97 F

## 2022-07-14 DIAGNOSIS — J34.3 HYPERTROPHY OF NASAL TURBINATES: ICD-10-CM

## 2022-07-14 DIAGNOSIS — G47.10 HYPERSOMNIA: ICD-10-CM

## 2022-07-14 DIAGNOSIS — G47.34 NOCTURNAL HYPOXEMIA: ICD-10-CM

## 2022-07-14 DIAGNOSIS — G47.33 OSA (OBSTRUCTIVE SLEEP APNEA): Primary | ICD-10-CM

## 2022-07-14 PROCEDURE — 99214 OFFICE O/P EST MOD 30 MIN: CPT | Performed by: INTERNAL MEDICINE

## 2022-07-14 ASSESSMENT — SLEEP AND FATIGUE QUESTIONNAIRES
HOW LIKELY ARE YOU TO NOD OFF OR FALL ASLEEP WHILE SITTING INACTIVE IN A PUBLIC PLACE: 0
HOW LIKELY ARE YOU TO NOD OFF OR FALL ASLEEP WHEN YOU ARE A PASSENGER IN A CAR FOR AN HOUR WITHOUT A BREAK: 3
ESS TOTAL SCORE: 10
HOW LIKELY ARE YOU TO NOD OFF OR FALL ASLEEP IN A CAR, WHILE STOPPED FOR A FEW MINUTES IN TRAFFIC: 0
HOW LIKELY ARE YOU TO NOD OFF OR FALL ASLEEP WHILE SITTING AND READING: 2
HOW LIKELY ARE YOU TO NOD OFF OR FALL ASLEEP WHILE SITTING QUIETLY AFTER LUNCH WITHOUT ALCOHOL: 2
HOW LIKELY ARE YOU TO NOD OFF OR FALL ASLEEP WHILE SITTING AND TALKING TO SOMEONE: 0
HOW LIKELY ARE YOU TO NOD OFF OR FALL ASLEEP WHILE WATCHING TV: 0
HOW LIKELY ARE YOU TO NOD OFF OR FALL ASLEEP WHILE LYING DOWN TO REST IN THE AFTERNOON WHEN CIRCUMSTANCES PERMIT: 3

## 2022-07-28 RX ORDER — FERROUS SULFATE 325(65) MG
325 TABLET ORAL DAILY
Qty: 30 TABLET | Refills: 11 | Status: SHIPPED | OUTPATIENT
Start: 2022-07-28

## 2022-07-28 NOTE — TELEPHONE ENCOUNTER
PATIENT CALLED REFILL LINE REQUESTING FERROUS SULFATE 325MG . I PEND ORDER WANT IT SEND IN TO [facility]. ....  LANA YUSUF

## 2022-09-12 ENCOUNTER — TELEPHONE (OUTPATIENT)
Dept: CARDIOLOGY CLINIC | Age: 57
End: 2022-09-12

## 2022-09-12 DIAGNOSIS — I25.10 CORONARY ARTERY DISEASE INVOLVING NATIVE CORONARY ARTERY OF NATIVE HEART WITHOUT ANGINA PECTORIS: Primary | ICD-10-CM

## 2022-09-12 DIAGNOSIS — R60.0 EDEMA OF BOTH LEGS: ICD-10-CM

## 2022-09-12 RX ORDER — FUROSEMIDE 40 MG/1
40 TABLET ORAL DAILY
Qty: 30 TABLET | Refills: 5 | Status: SHIPPED | OUTPATIENT
Start: 2022-09-12

## 2022-09-12 NOTE — TELEPHONE ENCOUNTER
Abdulaziz Berry MD  to Me    FRANKY    3:01 PM  Get an echocardiogram to assess for diastolic heart failure. Started on Lasix 40 mg p.o. daily and have her follow-up with me in the office. I called and informed pt. of MD response and she v/u. Med escribed as below and note sent to scheduling to schedule ECHO(PT.WILL GO TO ANY LOCATION)Please make fu w/ after ECHO    Requested Prescriptions     Signed Prescriptions Disp Refills    furosemide (LASIX) 40 MG tablet 30 tablet 5     Sig: Take 1 tablet by mouth daily     Authorizing Provider: Dave Judd     Ordering User: KAYLIE Florian

## 2022-09-12 NOTE — TELEPHONE ENCOUNTER
Patient called stating she has been experiencing tiredness and dizziness. She also states she is experiencing swelling in her feet and ankles-mostly on the left side. No active chest pain. Please call patient and advise.

## 2022-09-13 ENCOUNTER — TELEPHONE (OUTPATIENT)
Dept: CARDIOLOGY CLINIC | Age: 57
End: 2022-09-13

## 2022-09-13 NOTE — TELEPHONE ENCOUNTER
Patient called and stated she got put on a lasix. She read to not stay in the sun for long periods of time. Patient was just wondering if that means she cant be out in her yard during the weekend. Please call and advise.

## 2022-10-10 ENCOUNTER — HOSPITAL ENCOUNTER (OUTPATIENT)
Dept: MRI IMAGING | Age: 57
Discharge: HOME | End: 2022-10-13

## 2022-10-10 DIAGNOSIS — H81.13 BENIGN PAROXYSMAL POSITIONAL VERTIGO DUE TO BILATERAL VESTIBULAR DISORDER: ICD-10-CM

## 2022-10-18 ENCOUNTER — OFFICE VISIT (OUTPATIENT)
Dept: CARDIOLOGY CLINIC | Age: 57
End: 2022-10-18
Payer: COMMERCIAL

## 2022-10-18 VITALS
BODY MASS INDEX: 36.49 KG/M2 | DIASTOLIC BLOOD PRESSURE: 62 MMHG | HEIGHT: 59 IN | HEART RATE: 80 BPM | SYSTOLIC BLOOD PRESSURE: 112 MMHG | WEIGHT: 181 LBS

## 2022-10-18 DIAGNOSIS — Z79.4 TYPE 2 DIABETES MELLITUS WITHOUT COMPLICATION, WITH LONG-TERM CURRENT USE OF INSULIN (HCC): ICD-10-CM

## 2022-10-18 DIAGNOSIS — E11.9 TYPE 2 DIABETES MELLITUS WITHOUT COMPLICATION, WITH LONG-TERM CURRENT USE OF INSULIN (HCC): ICD-10-CM

## 2022-10-18 DIAGNOSIS — G47.33 OSA (OBSTRUCTIVE SLEEP APNEA): ICD-10-CM

## 2022-10-18 DIAGNOSIS — I25.10 CORONARY ARTERY DISEASE INVOLVING NATIVE CORONARY ARTERY OF NATIVE HEART WITHOUT ANGINA PECTORIS: Primary | ICD-10-CM

## 2022-10-18 DIAGNOSIS — I10 PRIMARY HYPERTENSION: ICD-10-CM

## 2022-10-18 DIAGNOSIS — I10 ESSENTIAL (PRIMARY) HYPERTENSION: ICD-10-CM

## 2022-10-18 PROCEDURE — 99214 OFFICE O/P EST MOD 30 MIN: CPT | Performed by: INTERNAL MEDICINE

## 2022-10-18 NOTE — PROGRESS NOTES
[address], [address] [place] [address], [address] [place] [address]  PHONE: [phone]    SUBJECTIVE: Solange Grant (1965) is a 61 y.o. female seen for a follow up visit regarding the following:   Specialty Problems          Cardiology Problems    Coronary artery disease involving native coronary artery of native heart without angina pectoris        Hypertension        Angina at rest [facility])         Pt doing well. No chest pain. No palpitations. Patient denies syncope. No dyspnea. States they are taking meds. Maintains a normal level of activity for them without symptoms. No dizziness or lightheadedness. All above conditions stable. Recent echocardiogram is actually within normal parameters LV function left atrial size RSVP      Past Medical History, Past Surgical History, Family history, Social History, and Medications were all reviewed with the patient today and updated as necessary.     No Known Allergies  Past Medical History:   Diagnosis Date    Allergic rhinitis     Anxiety     Carpal tunnel syndrome     Depression     Deviated septum     Diabetes ([facility] [address]     does not check blood sugars daily    GERD (gastroesophageal reflux disease)     contolled with medication    High cholesterol     Hypertension     Nasal obstruction     Nausea & vomiting     GILBERT (obstructive sleep apnea)     cpap     Past Surgical History:   Procedure Laterality Date    CARPAL TUNNEL RELEASE      bilateral    ORTHOPEDIC SURGERY      right trigger finger    ORTHOPEDIC SURGERY Right     foot surgery/    ORTHOPEDIC SURGERY Bilateral     carpal tunnel    ORTHOPEDIC SURGERY      left thumb surgery    OTHER SURGICAL HISTORY      lipoma removed from back    MAGDALENO AND BSO (CERVIX REMOVED)  2015    TUBAL LIGATION  1999     Family History   Problem Relation Age of Onset    Diabetes Brother     Post-op Nausea/Vomiting Neg Hx     Heart Disease Father         cabg/chf    Colon Cancer Father 58    Hypertension Father     Heart Disease Mother         mi    Cancer Mother         lung    Hypertension Mother     Ovarian Cancer Neg Hx     Post-op Cognitive Dysfunction Neg Hx     Emergence Delirium Neg Hx     Depression Other     Breast Cancer Paternal Aunt 58    Malig Hypertherm Neg Hx     Pseudochol.  Deficiency Neg Hx     Delayed Awakening Neg Hx     Heart Failure Father       Social History     Tobacco Use    Smoking status: Former     Packs/day: 0.50     Types: Cigarettes     Quit date: 3/27/2007     Years since quitting: 15.5    Smokeless tobacco: Never   Substance Use Topics    Alcohol use: No       Current Outpatient Medications:     furosemide (LASIX) 40 MG tablet, Take 1 tablet by mouth daily, Disp: 30 tablet, Rfl: 5    ferrous sulfate (IRON 325) 325 (65 Fe) MG tablet, Take 1 tablet by mouth in the morning., Disp: 30 tablet, Rfl: 11    ALPRAZolam (XANAX) 1 MG tablet, 1 po tid prn, Disp: , Rfl:     aspirin 81 MG EC tablet, Take 81 mg by mouth daily, Disp: , Rfl:     atorvastatin (LIPITOR) 10 MG tablet, Take 5 mg by mouth daily, Disp: , Rfl:     cetirizine (ZYRTEC) 10 MG tablet, Take 10 mg by mouth daily, Disp: , Rfl:     citalopram (CELEXA) 40 MG tablet, 20 mg daily , Disp: , Rfl:     losartan-hydroCHLOROthiazide (HYZAAR) 50-12.5 MG per tablet, Take 1 tablet by mouth daily, Disp: , Rfl:     metoprolol succinate (TOPROL XL) 50 MG extended release tablet, TAKE ONE TABLET BY MOUTH ONCE DAILY, Disp: , Rfl:     omeprazole (PRILOSEC) 20 MG delayed release capsule, Take 20 mg by mouth daily as needed, Disp: , Rfl:     ROS:  Review of Systems - General ROS: negative for - chills, fatigue or fever  Hematological and Lymphatic ROS: negative for - bleeding problems, bruising or jaundice  Respiratory ROS: no cough, shortness of breath, or wheezing  Cardiovascular ROS: no chest pain or dyspnea on exertion  Gastrointestinal ROS: no abdominal pain, change in bowel habits, or black or bloody stools  Neurological ROS: no TIA or stroke symptoms  All other systems negative. Wt Readings from Last 3 Encounters:   10/18/22 181 lb (82.1 kg)   10/04/22 174 lb (78.9 kg)   07/14/22 174 lb (78.9 kg)     Temp Readings from Last 3 Encounters:   07/14/22 97 °F (36.1 °C)     BP Readings from Last 3 Encounters:   10/18/22 112/62   10/04/22 130/62   07/14/22 128/62     Pulse Readings from Last 3 Encounters:   10/18/22 80   07/14/22 77   06/20/22 68       PHYSICAL EXAM:  /62   Pulse 80   Ht 4' 11\" (1.499 m)   Wt 181 lb (82.1 kg) Comment: with shoes  BMI 36.56 kg/m²   Physical Examination: General appearance - alert, well appearing, and in no distress  Mental status - alert, oriented to person, place, and time  Eyes - pupils equal and reactive, extraocular eye movements intact  Neck/lymph - supple, no significant adenopathy  Chest/lungs - clear to auscultation, no wheezes, rales or rhonchi, symmetric air entry  Heart/CV - normal rate, regular rhythm, normal S1, S2, no murmurs, rubs, clicks or gallops  Abdomen/GI - soft, nontender, nondistended, no masses or organomegaly  Musculoskeletal - no joint tenderness, deformity or swelling  Extremities - peripheral pulses normal, no pedal edema, no clubbing or cyanosis  Skin - normal coloration and turgor, no rashes, no suspicious skin lesions noted    EKG: normal EKG, normal sinus rhythm.          Medications reviewed and questions answered    Recent Results (from the past 672 hour(s))   Transthoracic echocardiogram (TTE) complete with contrast, bubble, strain, and 3D PRN    Collection Time: 10/04/22  7:47 AM   Result Value Ref Range    Body Surface Area 1.81 m2    RV Basal Dimension 2.1 cm    RV Mid Dimension 1.9 cm    TAPSE 2.5 1.7 cm    Est. RA Pressure 3 mmHg    LV EDV A2C 68 mL    LV EDV A4C 75 mL    LV ESV A2C 25 mL    LV ESV A4C 22 mL    IVSd 0.9 0.6 - 0.9 cm    LVIDd 4.1 3.9 - 5.3 cm    LVIDs 2.1 cm    LVOT Diameter 1.8 cm    LVOT Peak Velocity 1.1 m/s    LVOT Peak Gradient 5 mmHg    LVPWd 1.0 (A) 0.6 - 0.9 cm    LV E' Lateral Velocity 9 cm/s    LV E' Septal Velocity 6 cm/s    LV Ejection Fraction A2C 63 %    LV Ejection Fraction A4C 71 %    EF BP 68 55 - 100 %    LVOT Area 2.5 cm2    LA Minor Axis 3.4 cm    LA Major [place] 4.5 cm    LA Area 2C 11.0 cm2    LA Area 4C 15.0 cm2    LA Diameter 3.4 cm    AV Peak Velocity 1.5 m/s    AV Peak Gradient 9 mmHg    AV Area by Peak Velocity 1.9 cm2    Ascending Aorta 3.3 cm    MV Mean Gradient 2 mmHg    MV VTI 29.6 cm    MV Mean Velocity 0.6 m/s    MV Max Velocity 1.0 m/s    MV Peak Gradient 4 mmHg    RVIDd 3.2 cm    TR Max Velocity 2.28 m/s    TR Peak Gradient 21 mmHg    Fractional Shortening 2D 49 28 - 44 %    LV ESV Index A4C 13 mL/m2    LV EDV Index A4C 43 mL/m2    LV ESV Index A2C 14 mL/m2    LV EDV Index A2C 39 mL/m2    LVIDd Index 2.36 cm/m2    LVIDs Index 1.21 cm/m2    LV RWT Ratio 0.49     LV Mass 2D 123.0 67 - 162 g    LV Mass 2D Index 70.7 43 - 95 g/m2    LA Size Index 1.95 cm/m2    Ascending Aorta Index 1.90 cm/m2    AV Velocity Ratio 0.73     LEDY/BSA Peak Velocity 1.1 cm2/m2    RVSP 24 mmHg     Lab Results   Component Value Date/Time    CHOL 180 07/08/2021 02:41 PM    HDL 51 07/08/2021 02:41 PM       ASSESSMENT and PLAN  Problem List Items Addressed This Visit          Circulatory    Coronary artery disease involving native coronary artery of native heart without angina pectoris - Primary    Hypertension       Endocrine    Type 2 diabetes mellitus without complication, with long-term current use of insulin ([facility])       Respiratory    GILBERT (obstructive sleep apnea)     Other Visit Diagnoses       Essential (primary) hypertension               Cad stable no angina cont meds  Her overall overriding symptoms of fatigue mental fog dizziness really would not have any cardiac cause blood pressures well controlled and her other issues are stable continue medications and follow-up in 6 months    Continue meds as below    Current Outpatient Medications:     furosemide (LASIX) 40 MG tablet, Take 1 tablet by mouth daily, Disp: 30 tablet, Rfl: 5    ferrous sulfate (IRON 325) 325 (65 Fe) MG tablet, Take 1 tablet by mouth in the morning., Disp: 30 tablet, Rfl: 11    ALPRAZolam (XANAX) 1 MG tablet, 1 po tid prn, Disp: , Rfl:     aspirin 81 MG EC tablet, Take 81 mg by mouth daily, Disp: , Rfl:     atorvastatin (LIPITOR) 10 MG tablet, Take 5 mg by mouth daily, Disp: , Rfl:     cetirizine (ZYRTEC) 10 MG tablet, Take 10 mg by mouth daily, Disp: , Rfl:     citalopram (CELEXA) 40 MG tablet, 20 mg daily , Disp: , Rfl:     losartan-hydroCHLOROthiazide (HYZAAR) 50-12.5 MG per tablet, Take 1 tablet by mouth daily, Disp: , Rfl:     metoprolol succinate (TOPROL XL) 50 MG extended release tablet, TAKE ONE TABLET BY MOUTH ONCE DAILY, Disp: , Rfl:     omeprazole (PRILOSEC) 20 MG delayed release capsule, Take 20 mg by mouth daily as needed, Disp: , Rfl:     Lisbeth Damon MD  10/18/2022  8:17 AM    Pt is instructed to follow all appropriate cardiac risk factor recommendations and to be compliant with meds and testing. Instructed to follow up appropriately and seek urgent medical care if acute or unstable issues arise.  Results of some tests may be viewed thru [address] but this does not substitute for follow up with MD. If follow up is not scheduled pt is instructed to call for follow up

## 2022-10-22 ENCOUNTER — HOSPITAL ENCOUNTER (EMERGENCY)
Age: 57
Discharge: HOME | End: 2022-10-22
Attending: EMERGENCY MEDICINE
Payer: COMMERCIAL

## 2022-10-22 ENCOUNTER — HOSPITAL ENCOUNTER (EMERGENCY)
Dept: GENERAL RADIOLOGY | Age: 57
Discharge: HOME | End: 2022-10-25
Payer: COMMERCIAL

## 2022-10-22 VITALS
RESPIRATION RATE: 16 BRPM | TEMPERATURE: 97.9 F | OXYGEN SATURATION: 96 % | DIASTOLIC BLOOD PRESSURE: 67 MMHG | SYSTOLIC BLOOD PRESSURE: 126 MMHG | HEART RATE: 88 BPM | WEIGHT: 171 LBS | BODY MASS INDEX: 34.54 KG/M2

## 2022-10-22 DIAGNOSIS — E87.6 HYPOKALEMIA: ICD-10-CM

## 2022-10-22 DIAGNOSIS — R07.9 ACUTE CHEST PAIN: Primary | ICD-10-CM

## 2022-10-22 LAB
ALBUMIN SERPL-MCNC: 4 G/DL (ref 3.5–5)
ALBUMIN/GLOB SERPL: 1.1 {RATIO} (ref 0.4–1.6)
ALP SERPL-CCNC: 85 U/L (ref 50–136)
ALT SERPL-CCNC: 49 U/L (ref 12–65)
ANION GAP SERPL CALC-SCNC: 8 MMOL/L (ref 2–11)
AST SERPL-CCNC: 27 U/L (ref 15–37)
BASOPHILS # BLD: 0 K/UL (ref 0–0.2)
BASOPHILS NFR BLD: 1 % (ref 0–2)
BILIRUB SERPL-MCNC: 0.6 MG/DL (ref 0.2–1.1)
BUN SERPL-MCNC: 20 MG/DL (ref 6–23)
CALCIUM SERPL-MCNC: 10.4 MG/DL (ref 8.3–10.4)
CHLORIDE SERPL-SCNC: 95 MMOL/L (ref 101–110)
CO2 SERPL-SCNC: 36 MMOL/L (ref 21–32)
CREAT SERPL-MCNC: 0.9 MG/DL (ref 0.6–1)
D DIMER PPP FEU-MCNC: <0.27 UG/ML(FEU)
DIFFERENTIAL METHOD BLD: ABNORMAL
EOSINOPHIL # BLD: 0.1 K/UL (ref 0–0.8)
EOSINOPHIL NFR BLD: 2 % (ref 0.5–7.8)
ERYTHROCYTE [DISTWIDTH] IN BLOOD BY AUTOMATED COUNT: 13.1 % (ref 11.9–14.6)
GLOBULIN SER CALC-MCNC: 3.6 G/DL (ref 2.8–4.5)
GLUCOSE SERPL-MCNC: 197 MG/DL (ref 65–100)
HCT VFR BLD AUTO: 40.4 % (ref 35.8–46.3)
HGB BLD-MCNC: 14.1 G/DL (ref 11.7–15.4)
IMM GRANULOCYTES # BLD AUTO: 0 K/UL (ref 0–0.5)
IMM GRANULOCYTES NFR BLD AUTO: 0 % (ref 0–5)
LYMPHOCYTES # BLD: 2.2 K/UL (ref 0.5–4.6)
LYMPHOCYTES NFR BLD: 33 % (ref 13–44)
MAGNESIUM SERPL-MCNC: 2.4 MG/DL (ref 1.8–2.4)
MCH RBC QN AUTO: 30.7 PG (ref 26.1–32.9)
MCHC RBC AUTO-ENTMCNC: 34.9 G/DL (ref 31.4–35)
MCV RBC AUTO: 88 FL (ref 82–102)
MONOCYTES # BLD: 0.4 K/UL (ref 0.1–1.3)
MONOCYTES NFR BLD: 6 % (ref 4–12)
NEUTS SEG # BLD: 3.8 K/UL (ref 1.7–8.2)
NEUTS SEG NFR BLD: 58 % (ref 43–78)
NRBC # BLD: 0 K/UL (ref 0–0.2)
PLATELET # BLD AUTO: 227 K/UL (ref 150–450)
PMV BLD AUTO: 9.1 FL (ref 9.4–12.3)
POTASSIUM SERPL-SCNC: 2.9 MMOL/L (ref 3.5–5.1)
PROT SERPL-MCNC: 7.6 G/DL (ref 6.3–8.2)
RBC # BLD AUTO: 4.59 M/UL (ref 4.05–5.2)
SODIUM SERPL-SCNC: 139 MMOL/L (ref 133–143)
TROPONIN I SERPL HS-MCNC: <3 PG/ML (ref 0–14)
TROPONIN I SERPL HS-MCNC: <3 PG/ML (ref 0–14)
WBC # BLD AUTO: 6.6 K/UL (ref 4.3–11.1)

## 2022-10-22 PROCEDURE — 71045 X-RAY EXAM CHEST 1 VIEW: CPT

## 2022-10-22 PROCEDURE — 85025 COMPLETE CBC W/AUTO DIFF WBC: CPT

## 2022-10-22 PROCEDURE — 93005 ELECTROCARDIOGRAM TRACING: CPT | Performed by: EMERGENCY MEDICINE

## 2022-10-22 PROCEDURE — 99285 EMERGENCY DEPT VISIT HI MDM: CPT

## 2022-10-22 PROCEDURE — 85379 FIBRIN DEGRADATION QUANT: CPT

## 2022-10-22 PROCEDURE — 80053 COMPREHEN METABOLIC PANEL: CPT

## 2022-10-22 PROCEDURE — 84484 ASSAY OF TROPONIN QUANT: CPT

## 2022-10-22 PROCEDURE — 83735 ASSAY OF MAGNESIUM: CPT

## 2022-10-22 PROCEDURE — 6370000000 HC RX 637 (ALT 250 FOR IP): Performed by: EMERGENCY MEDICINE

## 2022-10-22 RX ORDER — NITROGLYCERIN 0.4 MG/1
TABLET SUBLINGUAL
Qty: 25 TABLET | Refills: 1 | Status: SHIPPED | OUTPATIENT
Start: 2022-10-22

## 2022-10-22 RX ORDER — NITROGLYCERIN 0.4 MG/1
0.4 TABLET SUBLINGUAL EVERY 5 MIN PRN
Status: DISCONTINUED | OUTPATIENT
Start: 2022-10-22 | End: 2022-10-22 | Stop reason: HOSPADM

## 2022-10-22 RX ORDER — ASPIRIN 81 MG/1
162 TABLET, CHEWABLE ORAL
Status: COMPLETED | OUTPATIENT
Start: 2022-10-22 | End: 2022-10-22

## 2022-10-22 RX ADMIN — NITROGLYCERIN 0.4 MG: 0.4 TABLET, ORALLY DISINTEGRATING SUBLINGUAL at 19:14

## 2022-10-22 RX ADMIN — ASPIRIN 162 MG: 81 TABLET, CHEWABLE ORAL at 19:13

## 2022-10-22 ASSESSMENT — ENCOUNTER SYMPTOMS
SHORTNESS OF BREATH: 1
ABDOMINAL PAIN: 0
NAUSEA: 0
BACK PAIN: 0
VOMITING: 0

## 2022-10-22 ASSESSMENT — PAIN - FUNCTIONAL ASSESSMENT: PAIN_FUNCTIONAL_ASSESSMENT: 0-10

## 2022-10-22 ASSESSMENT — PAIN SCALES - GENERAL: PAINLEVEL_OUTOF10: 4

## 2022-10-22 NOTE — ED PROVIDER NOTES
Emergency Department Provider Note                   PCP:                Herbert Chan MD               Age: 61 y.o. Sex: female     No diagnosis found. DISPOSITION          MDM  Number of Diagnoses or Management Options  Diagnosis management comments: Chest pain with shortness of breath. Check EKG and serial troponins. Screen for pulmonary embolism. Also possibility of GI or chest wall origin. Amount and/or Complexity of Data Reviewed  Clinical lab tests: ordered and reviewed  Tests in the radiology section of CPT®: reviewed  Tests in the medicine section of CPT®: ordered and reviewed  Review and summarize past medical records: yes  Independent visualization of images, tracings, or specimens: yes (My interpretation EKG shows normal sinus rhythm at 79. No ST-T changes. No ectopy. Normal QT interval.)    Risk of Complications, Morbidity, and/or Mortality  Presenting problems: moderate  Diagnostic procedures: minimal  Management options: low               No orders of the defined types were placed in this encounter. Medications - No data to display    New Prescriptions    No medications on file        Mary Elliott is a 61 y.o. female who presents to the Emergency Department with chief complaint of    Chief Complaint   Patient presents with    Chest Pain      48-year-old female complains of 1 hour history of comfort in her left chest going to her left shoulder and neck. Seems to be associated some palpitations and shortness of breath as well. Pain not pleuritic. No fever chills. No URI symptoms. Did not try any medications for it. Patient has history hypertension diabetes and hysterectomy in the past.  States she had a cardiac catheterization in the past but no stents. Review of records reveals no obstructive disease but some irregularities in the LAD associated with elevated left ventricular end-diastolic pressures. The history is provided by the patient.    Chest Pain  Pain location:  L chest  Pain quality: pressure    Pain radiates to:  Neck, L shoulder and L arm  Pain severity:  Moderate  Onset quality:  Sudden  Duration:  60 minutes  Timing:  Constant  Progression:  Waxing and waning  Chronicity:  New  Context: not breathing    Relieved by:  Nothing  Worsened by:  Nothing  Ineffective treatments:  None tried  Associated symptoms: dizziness (History of vertigo that has been worked up with MRI which was negative, referral to ENT and other specialist pending), palpitations and shortness of breath    Associated symptoms: no abdominal pain, no back pain, no fever, no nausea, no numbness, no syncope, no vomiting and no weakness        Review of Systems   Constitutional:  Negative for chills and fever. Respiratory:  Positive for shortness of breath. Cardiovascular:  Positive for chest pain and palpitations. Negative for syncope. Gastrointestinal:  Negative for abdominal pain, nausea and vomiting. Musculoskeletal:  Negative for back pain. Neurological:  Positive for dizziness (History of vertigo that has been worked up with MRI which was negative, referral to ENT and other specialist pending). Negative for weakness and numbness. All other systems reviewed and are negative.     Past Medical History:   Diagnosis Date    Allergic rhinitis     Anxiety     Carpal tunnel syndrome     Depression     Deviated septum     Diabetes ([facility] [address]     does not check blood sugars daily    GERD (gastroesophageal reflux disease)     contolled with medication    High cholesterol     Hypertension     Nasal obstruction     Nausea & vomiting     GILBERT (obstructive sleep apnea)     cpap        Past Surgical History:   Procedure Laterality Date    CARPAL TUNNEL RELEASE      bilateral    ORTHOPEDIC SURGERY      right trigger finger    ORTHOPEDIC SURGERY Right     foot surgery/    ORTHOPEDIC SURGERY Bilateral     carpal tunnel    ORTHOPEDIC SURGERY      left thumb surgery    OTHER SURGICAL HISTORY      lipoma removed from back    MAGDALENO AND BSO (CERVIX REMOVED)  2015    TUBAL LIGATION  1999        Family History   Problem Relation Age of Onset    Diabetes Brother     Post-op Nausea/Vomiting Neg Hx     Heart Disease Father         cabg/chf    Colon Cancer Father 58    Hypertension Father     Heart Disease Mother         mi    Cancer Mother         lung    Hypertension Mother     Ovarian Cancer Neg Hx     Post-op Cognitive Dysfunction Neg Hx     Emergence Delirium Neg Hx     Depression Other     Breast Cancer Paternal Aunt 58    Malig Hypertherm Neg Hx     Pseudochol. Deficiency Neg Hx     Delayed Awakening Neg Hx     Heart Failure Father         Social History     Socioeconomic History    Marital status:    Tobacco Use    Smoking status: Former     Packs/day: 0.50     Types: Cigarettes     Quit date: 3/27/2007     Years since quitting: 15.5    Smokeless tobacco: Never   Substance and Sexual Activity    Alcohol use: No    Drug use: No         Patient has no known allergies. Previous Medications    ALPRAZOLAM (XANAX) 1 MG TABLET    1 po tid prn    ASPIRIN 81 MG EC TABLET    Take 81 mg by mouth daily    ATORVASTATIN (LIPITOR) 10 MG TABLET    Take 5 mg by mouth daily    CETIRIZINE (ZYRTEC) 10 MG TABLET    Take 10 mg by mouth daily    CITALOPRAM (CELEXA) 40 MG TABLET    20 mg daily     FERROUS SULFATE (IRON 325) 325 (65 FE) MG TABLET    Take 1 tablet by mouth in the morning. FUROSEMIDE (LASIX) 40 MG TABLET    Take 1 tablet by mouth daily    LOSARTAN-HYDROCHLOROTHIAZIDE (HYZAAR) 50-12.5 MG PER TABLET    Take 1 tablet by mouth daily    METOPROLOL SUCCINATE (TOPROL XL) 50 MG EXTENDED RELEASE TABLET    TAKE ONE TABLET BY MOUTH ONCE DAILY    OMEPRAZOLE (PRILOSEC) 20 MG DELAYED RELEASE CAPSULE    Take 20 mg by mouth daily as needed        Vitals signs and nursing note reviewed.    Patient Vitals for the past 4 hrs:   Temp Pulse Resp BP SpO2   10/22/22 1747 97.9 °F (36.6 °C) 82 18 111/68 97 %          Physical Exam  Vitals and nursing note reviewed. Constitutional:       Appearance: She is not ill-appearing. HENT:      Head: Normocephalic and atraumatic. Right Ear: External ear normal.      Left Ear: External ear normal.      Mouth/Throat:      Mouth: Mucous membranes are moist.      Pharynx: Oropharynx is clear. Eyes:      General: No scleral icterus. Extraocular Movements: Extraocular movements intact. Pupils: Pupils are equal, round, and reactive to light. Cardiovascular:      Rate and Rhythm: Normal rate and regular rhythm. Pulmonary:      Effort: Pulmonary effort is normal.      Breath sounds: Normal breath sounds. Chest:       Abdominal:      Palpations: Abdomen is soft. Tenderness: There is no abdominal tenderness. Musculoskeletal:         General: No swelling or tenderness. Cervical back: Normal range of motion and neck supple. Right lower leg: No edema. Left lower leg: No edema. Skin:     General: Skin is warm and dry. Neurological:      General: No focal deficit present. Mental Status: She is alert. Procedures    No results found for any visits on 10/22/22.      No orders to display          Results Include:    Recent Results (from the past 24 hour(s))   EKG 12 Lead    Collection Time: 10/22/22  5:45 PM   Result Value Ref Range    Ventricular Rate 79 BPM    Atrial Rate 79 BPM    P-R Interval 190 ms    QRS Duration 74 ms    Q-T Interval 384 ms    QTc Calculation (Bazett) 440 ms    P Axis 47 degrees    R Axis 18 degrees    T Axis 28 degrees    Diagnosis Normal sinus rhythm    CBC with Auto Differential    Collection Time: 10/22/22  5:51 PM   Result Value Ref Range    WBC 6.6 4.3 - 11.1 K/uL    RBC 4.59 4.05 - 5.2 M/uL    Hemoglobin 14.1 11.7 - 15.4 g/dL    Hematocrit 40.4 35.8 - 46.3 %    MCV 88.0 82.0 - 102.0 FL    MCH 30.7 26.1 - 32.9 PG    MCHC 34.9 31.4 - 35.0 g/dL    RDW 13.1 11.9 - 14.6 %    Platelets 626 248 - 937 K/uL    MPV 9.1 (L) 9.4 - 12.3 FL    nRBC 0. 00 0.0 - 0.2 K/uL    Differential Type AUTOMATED      Seg Neutrophils 58 43 - 78 %    Lymphocytes 33 13 - 44 %    Monocytes 6 4.0 - 12.0 %    Eosinophils % 2 0.5 - 7.8 %    Basophils 1 0.0 - 2.0 %    Immature Granulocytes 0 0.0 - 5.0 %    Segs Absolute 3.8 1.7 - 8.2 K/UL    Absolute Lymph # 2.2 0.5 - 4.6 K/UL    Absolute Mono # 0.4 0.1 - 1.3 K/UL    Absolute Eos # 0.1 0.0 - 0.8 K/UL    Basophils Absolute 0.0 0.0 - 0.2 K/UL    Absolute Immature Granulocyte 0.0 0.0 - 0.5 K/UL   Comprehensive Metabolic Panel    Collection Time: 10/22/22  5:51 PM   Result Value Ref Range    Sodium 139 133 - 143 mmol/L    Potassium 2.9 (L) 3.5 - 5.1 mmol/L    Chloride 95 (L) 101 - 110 mmol/L    CO2 36 (H) 21 - 32 mmol/L    Anion Gap 8 2 - 11 mmol/L    Glucose 197 (H) 65 - 100 mg/dL    BUN 20 6 - 23 MG/DL    Creatinine 0.90 0.6 - 1.0 MG/DL    Est, Glom Filt Rate >60 >60 ml/min/1.73m2    Calcium 10.4 8.3 - 10.4 MG/DL    Total Bilirubin 0.6 0.2 - 1.1 MG/DL    ALT 49 12 - 65 U/L    AST 27 15 - 37 U/L    Alk Phosphatase 85 50 - 136 U/L    Total Protein 7.6 6.3 - 8.2 g/dL    Albumin 4.0 3.5 - 5.0 g/dL    Globulin 3.6 2.8 - 4.5 g/dL    Albumin/Globulin Ratio 1.1 0.4 - 1.6     Magnesium    Collection Time: 10/22/22  5:51 PM   Result Value Ref Range    Magnesium 2.4 1.8 - 2.4 mg/dL   Troponin    Collection Time: 10/22/22  5:51 PM   Result Value Ref Range    Troponin, High Sensitivity <3.0 0 - 14 pg/mL   D-Dimer, Quantitative    Collection Time: 10/22/22  5:51 PM   Result Value Ref Range    D-Dimer, Quant <0.27 <0.56 ug/ml(FEU)   Troponin    Collection Time: 10/22/22  7:15 PM   Result Value Ref Range    Troponin, High Sensitivity <3.0 0 - 14 pg/mL     MRI BRAIN WO CONTRAST    Result Date: 10/10/2022  EXAMINATION:MRI BRAIN WO CONTRAST 10/10/2022 8:42 AM ACCESSION NUMBER: [id number] INDICATION: Benign paroxysmal vertigo, bilateral. 48-year-old female with vertigo. COMPARISON: No prior brain imaging available for comparison.  TECHNIQUE: Multiplanar multisequence MRI of the brain without the administration of intravenous contrast. FINDINGS: There is a minimal degree of small foci of T2/FLAIR hyperintensity within the periventricular and deep white matter, nonspecific but most commonly seen with chronic small vessel ischemic changes and within normal limits for patient age. No midline shift. No cerebral or cerebellar mass. No evidence of acute intracranial hemorrhage or acute infarct. No diffusion weighted signal abnormality is seen. The ventricles are within normal limits in size and configuration. No extra-axial fluid collections. No acute or subacute appearing intracranial abnormality. Transthoracic echocardiogram (TTE) complete with contrast, bubble, strain, and 3D PRN    Result Date: 10/4/2022    Left Ventricle: Normal left ventricular systolic function with a visually estimated EF of 60 - 65%. Left ventricle size is normal. Normal wall thickness. Normal wall motion. Normal diastolic function. Aortic Valve: Tricuspid valve. Technical qualifiers: Color flow Doppler was performed and pulse wave and/or continuous wave Doppler was performed. We will leave message with cardiology for follow-up. Have patient increase omeprazole to twice a day for 3 to 4 days. Prescription for nitroglycerin. Call cardiology for follow-up. Voice dictation software was used during the making of this note. This software is not perfect and grammatical and other typographical errors may be present. This note has not been completely proofread for errors.      Pooja Mo MD  10/22/22 1816       Pooja Mo MD  10/22/22 2125

## 2022-10-23 LAB
EKG ATRIAL RATE: 79 BPM
EKG DIAGNOSIS: NORMAL
EKG P AXIS: 47 DEGREES
EKG P-R INTERVAL: 190 MS
EKG Q-T INTERVAL: 384 MS
EKG QRS DURATION: 74 MS
EKG QTC CALCULATION (BAZETT): 440 MS
EKG R AXIS: 18 DEGREES
EKG T AXIS: 28 DEGREES
EKG VENTRICULAR RATE: 79 BPM

## 2022-10-23 NOTE — ED NOTES
I have reviewed discharge instructions with the patient and spouse. The patient and spouse verbalized understanding. Patient left ED via Discharge Method: ambulatory to Home with spouse. Opportunity for questions and clarification provided. Patient given 1 scripts. To continue your aftercare when you leave the hospital, you may receive an automated call from our care team to check in on how you are doing. This is a free service and part of our promise to provide the best care and service to meet your aftercare needs.  If you have questions, or wish to unsubscribe from this service please call [phone]. Thank you for Choosing our [address] [place] [address] Emergency Department.       Juan Barone RN  10/22/22 1391

## 2022-10-24 ENCOUNTER — TELEPHONE (OUTPATIENT)
Dept: CARDIOLOGY CLINIC | Age: 57
End: 2022-10-24

## 2022-10-24 NOTE — TELEPHONE ENCOUNTER
Patient was seen in the ER for chest pain. States all tests come back normal. Would like to know if Dr. Roxana Cota needs to see her.

## 2022-10-24 NOTE — TELEPHONE ENCOUNTER
Was seen in ER for CP and told it may have been bad acid reflux or low potassium. She was told to call here for a fu appt. She wants to know if Eleanor Mclean can review her ER notes and tell her if it is necessary to see him. She was just seen here 10/18/22

## 2022-10-24 NOTE — TELEPHONE ENCOUNTER
Cecily Lowing, MD Tasia Frankel, RN  Caller: Unspecified (Today,  3:36 PM)  It looks like her emergency room work-up was negative indicating probably noncardiac issue therefore she can keep her routine follow-up with me and would not need to be worked in earlier

## 2022-11-04 ENCOUNTER — HOSPITAL ENCOUNTER (OUTPATIENT)
Dept: CT IMAGING | Age: 57
Discharge: HOME | End: 2022-11-07

## 2022-11-04 DIAGNOSIS — R93.89 ABNORMAL RADIOGRAPHIC EXAMINATION: ICD-10-CM

## 2022-11-29 ENCOUNTER — OFFICE VISIT (OUTPATIENT)
Dept: ENT CLINIC | Age: 57
End: 2022-11-29
Payer: COMMERCIAL

## 2022-11-29 VITALS
WEIGHT: 174 LBS | HEIGHT: 59 IN | BODY MASS INDEX: 35.08 KG/M2 | DIASTOLIC BLOOD PRESSURE: 80 MMHG | SYSTOLIC BLOOD PRESSURE: 114 MMHG

## 2022-11-29 DIAGNOSIS — R42 DIZZINESS: ICD-10-CM

## 2022-11-29 DIAGNOSIS — G43.909 MIGRAINE SYNDROME: ICD-10-CM

## 2022-11-29 DIAGNOSIS — G43.809 VESTIBULAR MIGRAINE: Primary | ICD-10-CM

## 2022-11-29 DIAGNOSIS — R41.89 BRAIN FOG: ICD-10-CM

## 2022-11-29 PROCEDURE — 99204 OFFICE O/P NEW MOD 45 MIN: CPT | Performed by: STUDENT IN AN ORGANIZED HEALTH CARE EDUCATION/TRAINING PROGRAM

## 2022-11-29 PROCEDURE — 3078F DIAST BP <80 MM HG: CPT | Performed by: STUDENT IN AN ORGANIZED HEALTH CARE EDUCATION/TRAINING PROGRAM

## 2022-11-29 PROCEDURE — 3074F SYST BP LT 130 MM HG: CPT | Performed by: STUDENT IN AN ORGANIZED HEALTH CARE EDUCATION/TRAINING PROGRAM

## 2022-11-29 RX ORDER — NORTRIPTYLINE HYDROCHLORIDE 10 MG/1
CAPSULE ORAL
Qty: 60 CAPSULE | Refills: 3 | Status: SHIPPED | OUTPATIENT
Start: 2022-11-29

## 2022-11-29 ASSESSMENT — ENCOUNTER SYMPTOMS
COLOR CHANGE: 0
ABDOMINAL DISTENTION: 0
EYE DISCHARGE: 0
APNEA: 0

## 2022-11-29 NOTE — PROGRESS NOTES
HPI:  Jennifer Balderrama is a 61 y.o. female seen New    Chief Complaint   Patient presents with    Dizziness     Patient presents today with c/o dizziness , lightheadedness, nausea , and fainting . Patient states that she has ruled out neuro/cardio related events , she'd like to rule out inner ear . She has done vestibular rehab as well . 13-year-old female seen as a new patient referral evaluation with complaint of dizziness and headache symptoms. His symptoms have been ongoing for the last 6 to 8 months described as having a nearly daily head type pain, brain fog sensation and dizziness symptoms. She describes her dizziness as a somewhat sensation of room moving as if she is going to fall forward and at times a sensation of shaking or a brain fog. This happens on most days but not every day. Some days are worse than others. There has been a significant work-up both with neurology and cardiology which was all unremarkable. There was some suspicion that there could be a inner ear dysfunction and therefore this prompted evaluation with ENT. There has been no treatment to date for her chronic head pain although there has been a suspicion of migraines. Past Medical History, Past Surgical History, Family history, Social History, and Medications were all reviewed with the patient today and updated as necessary. No Known Allergies    Patient Active Problem List   Diagnosis    Trigger finger, right middle finger    Hoarseness    Type 2 diabetes mellitus without complication, with long-term current use of insulin ([facility])    Carpal tunnel syndrome    GILBERT (obstructive sleep apnea)    Arthritis of carpometacarpal (CMC) joint of right thumb    Acquired trigger finger of left middle finger    Nocturnal hypoxemia    Obesity (BMI 30-39. 9)    Angina at rest [facility])    Depression    Hypertension    Nasal obstruction    Hypertrophy of nasal turbinates    Anxiety    GERD (gastroesophageal reflux disease) Allergic rhinitis    Trigger finger, left ring finger    Coronary artery disease involving native coronary artery of native heart without angina pectoris    Chronic fatigue    Hypersomnia       Current Outpatient Medications   Medication Sig    nortriptyline (PAMELOR) 10 MG capsule Take one 10 mg capsule before bed for the first 7 days and then take two 10 mg capsules before bed going forward. nitroGLYCERIN (NITROSTAT) 0.4 MG SL tablet up to max of 3 total doses. If no relief after 3 doses, seek medical attention    furosemide (LASIX) 40 MG tablet Take 1 tablet by mouth daily    ferrous sulfate (IRON 325) 325 (65 Fe) MG tablet Take 1 tablet by mouth in the morning. ALPRAZolam (XANAX) 1 MG tablet 1 po tid prn    aspirin 81 MG EC tablet Take 81 mg by mouth daily    atorvastatin (LIPITOR) 10 MG tablet Take 5 mg by mouth daily    cetirizine (ZYRTEC) 10 MG tablet Take 10 mg by mouth daily    citalopram (CELEXA) 40 MG tablet 20 mg daily     losartan-hydroCHLOROthiazide (HYZAAR) 50-12.5 MG per tablet Take 1 tablet by mouth daily    metoprolol succinate (TOPROL XL) 50 MG extended release tablet TAKE ONE TABLET BY MOUTH ONCE DAILY    omeprazole (PRILOSEC) 20 MG delayed release capsule Take 20 mg by mouth daily as needed     No current facility-administered medications for this visit.        Past Medical History:   Diagnosis Date    Allergic rhinitis     Anxiety     Carpal tunnel syndrome     Depression     Deviated septum     Diabetes ([facility] [address]     does not check blood sugars daily    GERD (gastroesophageal reflux disease)     contolled with medication    High cholesterol     Hypertension     Nasal obstruction     Nausea & vomiting     GILBERT (obstructive sleep apnea)     cpap       Past Surgical History:   Procedure Laterality Date    CARPAL TUNNEL RELEASE      bilateral    ORTHOPEDIC SURGERY      right trigger finger    ORTHOPEDIC SURGERY Right     foot surgery/    ORTHOPEDIC SURGERY Bilateral     carpal tunnel    ORTHOPEDIC SURGERY      left thumb surgery    OTHER SURGICAL HISTORY      lipoma removed from back    MAGDALENO AND BSO (CERVIX REMOVED)  2015    TUBAL LIGATION  1999       Social History     Tobacco Use    Smoking status: Former     Packs/day: 0.50     Types: Cigarettes     Quit date: 3/27/2007     Years since quitting: 15.6    Smokeless tobacco: Never   Substance Use Topics    Alcohol use: No       Family History   Problem Relation Age of Onset    Diabetes Brother     Post-op Nausea/Vomiting Neg Hx     Heart Disease Father         cabg/chf    Colon Cancer Father 58    Hypertension Father     Heart Disease Mother         mi    Cancer Mother         lung    Hypertension Mother     Ovarian Cancer Neg Hx     Post-op Cognitive Dysfunction Neg Hx     Emergence Delirium Neg Hx     Depression Other     Breast Cancer Paternal Aunt 58    Malig Hypertherm Neg Hx     Pseudochol. Deficiency Neg Hx     Delayed Awakening Neg Hx     Heart Failure Father         ROS:    Review of Systems   Constitutional:  Negative for activity change. HENT:  Negative for congestion. Eyes:  Negative for discharge. Respiratory:  Negative for apnea. Cardiovascular:  Negative for chest pain. Gastrointestinal:  Negative for abdominal distention. Endocrine: Negative for cold intolerance. Genitourinary:  Negative for difficulty urinating. Musculoskeletal:  Negative for arthralgias. Skin:  Negative for color change. Allergic/Immunologic: Negative for environmental allergies. Neurological:  Positive for dizziness, light-headedness and headaches. Hematological:  Negative for adenopathy. Psychiatric/Behavioral:  Negative for agitation. PHYSICAL EXAM:    /80   Ht 4' 11\" (1.499 m)   Wt 174 lb (78.9 kg)   BMI 35.14 kg/m²     Physical Exam  Vitals and nursing note reviewed. Constitutional:       Appearance: Normal appearance. HENT:      Head: Normocephalic and atraumatic.       Right Ear: Tympanic membrane, ear canal and external ear normal. No middle ear effusion. There is no impacted cerumen. Tympanic membrane is not scarred, perforated, erythematous or retracted. Left Ear: Tympanic membrane, ear canal and external ear normal.  No middle ear effusion. There is no impacted cerumen. Tympanic membrane is not scarred, perforated, erythematous or retracted. Ears:      Comments: Binocular microscopy revealed:    Bilateral EACs patent without edema erythema or lesions  Bilateral TMs intact with no perforations retractions or middle ear effusions       Nose: Nose normal. No congestion or rhinorrhea. Mouth/Throat:      Mouth: Mucous membranes are moist.      Pharynx: Oropharynx is clear. No oropharyngeal exudate or posterior oropharyngeal erythema. Eyes:      General: No scleral icterus. Pulmonary:      Effort: Pulmonary effort is normal.   Musculoskeletal:      Cervical back: Normal range of motion and neck supple. No rigidity. Lymphadenopathy:      Cervical: No cervical adenopathy. Skin:     General: Skin is warm and dry. Neurological:      Mental Status: She is alert and oriented to person, place, and time. Psychiatric:         Mood and Affect: Mood normal.         Behavior: Behavior normal.              ASSESSMENT and PLAN        ICD-10-CM    1. Vestibular migraine  G43.809       2. Migraine syndrome  G43.909       3. Dizziness  R42       4. Brain fog  R41.89           Patient was reassured no concerns on today's ear exam with the bilateral TMs intact no perforation retractions or middle ear effusions. Also negative Paris-Hallpike testing on exam.    We discussed that is most likely not inner ear pathology. In the setting of almost daily head pain and room moving sensation and brain fog we have discussed that she most likely may be suffering from a chronic migraine syndrome for the last 6 to 8 months. She does have a notable family history with her mother having migraines when she was younger.     We have discussed pathophysiology of migraines as well as atypical migraine with a manifestation of vestibular migraines. We have discussed this association as a neurological abnormality. We will trial her on migraine preventative medication to see if there is decrease of symptoms. Nortriptyline has been discussed and we will start her on 10 mg in the evenings for the first week and then 20 mg in the evenings going forward. I will see her back in 6 to 8 weeks to assess for improvement. If there is no improvement we will have her referred back to neurology for further medical recommendations.     Obi Izaguirre,   11/29/2022

## 2023-02-15 ENCOUNTER — OFFICE VISIT (OUTPATIENT)
Dept: ENT CLINIC | Age: 58
End: 2023-02-15
Payer: COMMERCIAL

## 2023-02-15 VITALS — RESPIRATION RATE: 17 BRPM | HEIGHT: 59 IN | WEIGHT: 183 LBS | BODY MASS INDEX: 36.89 KG/M2

## 2023-02-15 DIAGNOSIS — G43.809 VESTIBULAR MIGRAINE: Primary | ICD-10-CM

## 2023-02-15 DIAGNOSIS — R09.89 GLOBUS SENSATION: ICD-10-CM

## 2023-02-15 PROCEDURE — 99213 OFFICE O/P EST LOW 20 MIN: CPT | Performed by: STUDENT IN AN ORGANIZED HEALTH CARE EDUCATION/TRAINING PROGRAM

## 2023-02-15 ASSESSMENT — ENCOUNTER SYMPTOMS
APNEA: 0
COLOR CHANGE: 0
ABDOMINAL DISTENTION: 0
EYE DISCHARGE: 0

## 2023-02-15 NOTE — PROGRESS NOTES
HPI:  Lora Grant is a 61 y.o. female seen Established   Chief Complaint   Patient presents with    Follow-up     Patient states that headaches have improved from daily to a few times per week . Patient states that she would like to discuss potential underlying cause of migraine . She'd like to also discuss coming off of medications. 58-year-old female seen as a follow-up evaluation previously seen for vertigo and migraine. She was diagnosed with likely vestibular migraines having symptoms described as almost daily. She had already been evaluated with neurology and cardiology for which was unremarkable. For high suspicion of vestibular migraine she was started on nortriptyline 10 mg daily. She admits that she has had a significant improvement in the frequency of her symptoms now having symptoms 2 or 3 times per week. She has had very little if any vertigo in the last 2 weeks. She is still somewhat hesitant to continue this medication long-term. She has questions regarding potential etiology of migraine. Past Medical History, Past Surgical History, Family history, Social History, and Medications were all reviewed with the patient today and updated as necessary. No Known Allergies    Patient Active Problem List   Diagnosis    Trigger finger, right middle finger    Hoarseness    Type 2 diabetes mellitus without complication, with long-term current use of insulin ([facility])    Carpal tunnel syndrome    GILBERT (obstructive sleep apnea)    Arthritis of carpometacarpal (CMC) joint of right thumb    Acquired trigger finger of left middle finger    Nocturnal hypoxemia    Obesity (BMI 30-39. 9)    Angina at rest [facility])    Depression    Hypertension    Nasal obstruction    Hypertrophy of nasal turbinates    Anxiety    GERD (gastroesophageal reflux disease)    Allergic rhinitis    Trigger finger, left ring finger    Coronary artery disease involving native coronary artery of native heart without angina pectoris    Chronic fatigue    Hypersomnia       Current Outpatient Medications   Medication Sig    nortriptyline (PAMELOR) 10 MG capsule Take one 10 mg capsule before bed for the first 7 days and then take two 10 mg capsules before bed going forward. nitroGLYCERIN (NITROSTAT) 0.4 MG SL tablet up to max of 3 total doses. If no relief after 3 doses, seek medical attention    furosemide (LASIX) 40 MG tablet Take 1 tablet by mouth daily    ferrous sulfate (IRON 325) 325 (65 Fe) MG tablet Take 1 tablet by mouth in the morning. ALPRAZolam (XANAX) 1 MG tablet 1 po tid prn    aspirin 81 MG EC tablet Take 81 mg by mouth daily    atorvastatin (LIPITOR) 10 MG tablet Take 5 mg by mouth daily    cetirizine (ZYRTEC) 10 MG tablet Take 10 mg by mouth daily    citalopram (CELEXA) 40 MG tablet 20 mg daily     losartan-hydroCHLOROthiazide (HYZAAR) 50-12.5 MG per tablet Take 1 tablet by mouth daily    metoprolol succinate (TOPROL XL) 50 MG extended release tablet TAKE ONE TABLET BY MOUTH ONCE DAILY    omeprazole (PRILOSEC) 20 MG delayed release capsule Take 20 mg by mouth daily as needed     No current facility-administered medications for this visit.        Past Medical History:   Diagnosis Date    Allergic rhinitis     Anxiety     Carpal tunnel syndrome     Depression     Deviated septum     Diabetes ([facility] [address]     does not check blood sugars daily    GERD (gastroesophageal reflux disease)     contolled with medication    High cholesterol     Hypertension     Nasal obstruction     Nausea & vomiting     GILBERT (obstructive sleep apnea)     cpap       Past Surgical History:   Procedure Laterality Date    CARPAL TUNNEL RELEASE      bilateral    ORTHOPEDIC SURGERY      right trigger finger    ORTHOPEDIC SURGERY Right     foot surgery/    ORTHOPEDIC SURGERY Bilateral     carpal tunnel    ORTHOPEDIC SURGERY      left thumb surgery    OTHER SURGICAL HISTORY      lipoma removed from back    MAGDALENO AND BSO (CERVIX REMOVED)  2015    TUBAL LIGATION 1999       Social History     Tobacco Use    Smoking status: Former     Packs/day: 0.50     Types: Cigarettes     Quit date: 3/27/2007     Years since quitting: 15.9    Smokeless tobacco: Never   Substance Use Topics    Alcohol use: No       Family History   Problem Relation Age of Onset    Diabetes Brother     Post-op Nausea/Vomiting Neg Hx     Heart Disease Father         cabg/chf    Colon Cancer Father 58    Hypertension Father     Heart Disease Mother         mi    Cancer Mother         lung    Hypertension Mother     Ovarian Cancer Neg Hx     Post-op Cognitive Dysfunction Neg Hx     Emergence Delirium Neg Hx     Depression Other     Breast Cancer Paternal Aunt 58    Malig Hypertherm Neg Hx     Pseudochol. Deficiency Neg Hx     Delayed Awakening Neg Hx     Heart Failure Father         ROS:    Review of Systems   Constitutional:  Negative for activity change. HENT:  Negative for congestion. Eyes:  Negative for discharge. Respiratory:  Negative for apnea. Cardiovascular:  Negative for chest pain. Gastrointestinal:  Negative for abdominal distention. Endocrine: Negative for cold intolerance. Genitourinary:  Negative for difficulty urinating. Musculoskeletal:  Negative for arthralgias. Skin:  Negative for color change. Allergic/Immunologic: Negative for environmental allergies. Neurological:  Positive for headaches. Negative for dizziness. Hematological:  Negative for adenopathy. Psychiatric/Behavioral:  Negative for agitation. PHYSICAL EXAM:    Resp 17   Ht 4' 11\" (1.499 m)   Wt 183 lb (83 kg)   BMI 36.96 kg/m²     Physical Exam  Vitals and nursing note reviewed. Constitutional:       Appearance: Normal appearance. HENT:      Head: Normocephalic and atraumatic. Right Ear: Tympanic membrane, ear canal and external ear normal. There is no impacted cerumen. Left Ear: Tympanic membrane, ear canal and external ear normal. There is no impacted cerumen.       Nose: Nose normal. No congestion or rhinorrhea. Mouth/Throat:      Mouth: Mucous membranes are moist.      Pharynx: Oropharynx is clear. No oropharyngeal exudate or posterior oropharyngeal erythema. Eyes:      General: No scleral icterus. Pulmonary:      Effort: Pulmonary effort is normal.   Musculoskeletal:      Cervical back: Normal range of motion and neck supple. No rigidity. Lymphadenopathy:      Cervical: No cervical adenopathy. Skin:     General: Skin is warm and dry. Neurological:      Mental Status: She is alert and oriented to person, place, and time. Psychiatric:         Mood and Affect: Mood normal.         Behavior: Behavior normal.              ASSESSMENT and PLAN        ICD-10-CM    1. Vestibular migraine  G43.809       2. Globus sensation  R09.89           Patient thankfully with significant improvement of symptoms of daily headache and vertigo after the initiation of nortriptyline 10 mg daily for the last several weeks for treatment of vestibular migraines. We have discussed at length again the pathophysiology of migraine syndrome and its associated symptoms and atypical manifestations. An educational paper handout was also provided. I have advised that she consider continuing the ongoing therapy for at least the next couple months before a trial of tapering off. She is free to try this on her own and she can taper down to every other day for approximately a month before coming off. If symptoms were to rebound she can reinitiate therapy. She can also follow-up with her neurologist as needed for any further concerns. Also states very long-term years of a globus type sensation lump to her throat. She has been on omeprazole very long-term for GERD without obvious reflux symptoms ongoing. She had an unremarkable exam and her neck showed no abnormal thyroid. She has been reassured.     Marco Antonio Toledo DO  2/15/2023

## 2023-03-20 RX ORDER — FUROSEMIDE 40 MG/1
40 TABLET ORAL DAILY
Qty: 90 TABLET | Refills: 0 | Status: SHIPPED | OUTPATIENT
Start: 2023-03-20

## 2023-03-20 NOTE — TELEPHONE ENCOUNTER
Requested Prescriptions     Pending Prescriptions Disp Refills    furosemide (LASIX) 40 MG tablet 90 tablet 0     Sig: Take 1 tablet by mouth daily

## 2023-03-20 NOTE — TELEPHONE ENCOUNTER
MEDICATION REFILL REQUEST      Name of Medication:  Furosemide  Dose:  40 mg  Frequency:  QD  Quantity:  ?  Days' supply:  ?       Pharmacy Name/Location:  [phone]

## 2023-06-20 ENCOUNTER — TELEPHONE (OUTPATIENT)
Age: 58
End: 2023-06-20

## 2023-06-20 RX ORDER — FUROSEMIDE 40 MG/1
40 TABLET ORAL DAILY
Qty: 90 TABLET | Refills: 0 | Status: SHIPPED | OUTPATIENT
Start: 2023-06-20

## 2023-06-20 NOTE — TELEPHONE ENCOUNTER
MEDICATION REFILL REQUEST      Name of Medication:  Furosemide  Dose:  40 mg  Frequency:  QD  Quantity:  ?  Days' supply:  ?       Pharmacy Name/Location:  [facility]  [phone]

## 2023-07-05 ENCOUNTER — TELEPHONE (OUTPATIENT)
Dept: SLEEP MEDICINE | Age: 58
End: 2023-07-05

## 2023-07-05 NOTE — TELEPHONE ENCOUNTER
Patient has moved to [facility] and [facility] the closest place for a [facility].  She needs a -referral from our office to get established there

## 2024-07-26 NOTE — ED TRIAGE NOTES
Problem: Discharge Planning  Goal: Discharge to home or other facility with appropriate resources  Outcome: Progressing  Flowsheets (Taken 7/25/2024 1916 by Josey Up RN)  Discharge to home or other facility with appropriate resources:   Identify barriers to discharge with patient and caregiver   Identify discharge learning needs (meds, wound care, etc)   Arrange for needed discharge resources and transportation as appropriate     Problem: Pain  Goal: Verbalizes/displays adequate comfort level or baseline comfort level  Outcome: Progressing     Problem: Safety - Adult  Goal: Free from fall injury  Outcome: Progressing      Pt states she started experiencing chest pain left upper area 30 minutes PTA to the ED. Pt states she has a cardiac HX and already has an established cardiologist. Denies N/V but admits to radiating pain down the left arm and to her neck area.

## (undated) DEVICE — STOCKINETTE ORTH W9XL36IN COT 2 PLY HLLW FOR HANDLING LMB

## (undated) DEVICE — BNDG,ELSTC,MATRIX,STRL,4"X5YD,LF,HOOK&LP: Brand: MEDLINE

## (undated) DEVICE — SURGICAL PROCEDURE PACK BASIC ST FRANCIS

## (undated) DEVICE — DRAPE,HAND,STERILE: Brand: MEDLINE

## (undated) DEVICE — CATHETER GUID 6FR L100CM BLU PTFE JL3.5 TRUELUMEN HYBRID

## (undated) DEVICE — SUTURE PDS II SZ 4-0 L18IN ABSRB UD L19MM PS-2 3/8 CIR PRIM Z496G

## (undated) DEVICE — SUTURE MCRYL SZ 4-0 L27IN ABSRB UD L19MM PS-2 1/2 CIR PRIM Y426H

## (undated) DEVICE — PRESSURE GUIDEWIRE: Brand: COMET™ II

## (undated) DEVICE — INTENT TO BE USED WITH SUTURE MATERIAL FOR TISSUE CLOSURE: Brand: RICHARD-ALLAN®  NEEDLE 1/2 CIRCLE REVERSE CUTTING

## (undated) DEVICE — SLING ARM AD ULT

## (undated) DEVICE — BNDG ELAS ESMARK 4INX12FT LF -- STRL

## (undated) DEVICE — WATERPROOF, BACTERIA PROOF DRESSING WITH ABSORBENT SEE THROUGH PAD: Brand: OPSITE POST-OP VISIBLE 15X10CM CTN 20

## (undated) DEVICE — CARDINAL HEALTH FLEXIBLE LIGHT HANDLE COVER: Brand: CARDINAL HEALTH

## (undated) DEVICE — STRIP,CLOSURE,WOUND,MEDI-STRIP,1/2X4: Brand: MEDLINE

## (undated) DEVICE — SUTURE STRATAFIX SPRL MCRYL + SZ 4-0 L12IN ABSRB UD PS-2 SXMP1B117

## (undated) DEVICE — DISPOSABLE BIPOLAR CODE, 12' (3.66 M): Brand: CONMED

## (undated) DEVICE — BANDAGE COMPR SELF ADH 5 YDX4 IN TAN STRL PREMIERPRO LF

## (undated) DEVICE — CATHETER DIAG AD 5FR L110CM 145DEG COR GRY HYDRPHLC NYL

## (undated) DEVICE — BAND RADIAL COMPR ARTERY 24CM -- REG BX/10

## (undated) DEVICE — MASTISOL ADHESIVE LIQ 2/3ML

## (undated) DEVICE — GLIDESHEATH SLENDER STAINLESS STEEL KIT: Brand: GLIDESHEATH SLENDER

## (undated) DEVICE — DISPOSABLE KIT FOR 1.8 MM Q-FIX                                    IMPLANT, INCLUDES DRILL, DRILL GUIDE                                    AND OBTURATOR: Brand: Q-FIX

## (undated) DEVICE — ZIMMER® STERILE DISPOSABLE TOURNIQUET CUFF WITH PLC, DUAL PORT, SINGLE BLADDER, 18 IN. (46 CM)

## (undated) DEVICE — (D)PREP SKN CHLRAPRP APPL 26ML -- CONVERT TO ITEM 371833

## (undated) DEVICE — SOLUTION IV 1000ML 0.9% SOD CHL

## (undated) DEVICE — SPONGE LAP 18X18IN STRL -- 5/PK

## (undated) DEVICE — AMD ANTIMICROBIAL GAUZE SPONGES,12 PLY USP TYPE VII, 0.2% POLYHEXAMETHYLENE BIGUANIDE HCI (PHMB): Brand: CURITY

## (undated) DEVICE — GUIDEWIRE 035IN 210CM PTFE COAT FIX COR J TIP 15MM FIRM BODY

## (undated) DEVICE — SUTURE PERMAHAND SZ 2-0 L30IN NONABSORBABLE BLK SILK W/O A305H

## (undated) DEVICE — RADIFOCUS OPTITORQUE ANGIOGRAPHIC CATHETER: Brand: OPTITORQUE

## (undated) DEVICE — COPILOT BLEEDBACK CONTROL VALVE: Brand: COPILOT